# Patient Record
Sex: FEMALE | Race: WHITE | NOT HISPANIC OR LATINO | Employment: FULL TIME | URBAN - METROPOLITAN AREA
[De-identification: names, ages, dates, MRNs, and addresses within clinical notes are randomized per-mention and may not be internally consistent; named-entity substitution may affect disease eponyms.]

---

## 2018-01-18 ENCOUNTER — TRANSFERRED RECORDS (OUTPATIENT)
Dept: MULTI SPECIALTY CLINIC | Facility: CLINIC | Age: 38
End: 2018-01-18

## 2018-01-18 LAB
HPV ABSTRACT: ABNORMAL
PAP-ABSTRACT: NORMAL

## 2019-04-17 ENCOUNTER — OFFICE VISIT (OUTPATIENT)
Dept: FAMILY MEDICINE | Facility: CLINIC | Age: 39
End: 2019-04-17
Payer: COMMERCIAL

## 2019-04-17 VITALS
DIASTOLIC BLOOD PRESSURE: 76 MMHG | SYSTOLIC BLOOD PRESSURE: 118 MMHG | TEMPERATURE: 98.9 F | OXYGEN SATURATION: 100 % | RESPIRATION RATE: 16 BRPM | HEART RATE: 76 BPM | WEIGHT: 155 LBS | BODY MASS INDEX: 22.96 KG/M2 | HEIGHT: 69 IN

## 2019-04-17 DIAGNOSIS — F41.9 ANXIETY: ICD-10-CM

## 2019-04-17 DIAGNOSIS — Z87.898 HISTORY OF ALCOHOL USE: Primary | ICD-10-CM

## 2019-04-17 DIAGNOSIS — F32.1 MODERATE MAJOR DEPRESSION (H): ICD-10-CM

## 2019-04-17 PROCEDURE — 99203 OFFICE O/P NEW LOW 30 MIN: CPT | Performed by: PHYSICIAN ASSISTANT

## 2019-04-17 RX ORDER — VENLAFAXINE HYDROCHLORIDE 75 MG/1
75 CAPSULE, EXTENDED RELEASE ORAL DAILY
Refills: 0 | COMMUNITY
Start: 2019-03-21 | End: 2019-04-17

## 2019-04-17 RX ORDER — VENLAFAXINE HYDROCHLORIDE 75 MG/1
75 CAPSULE, EXTENDED RELEASE ORAL DAILY
Qty: 90 CAPSULE | Refills: 0 | Status: SHIPPED | OUTPATIENT
Start: 2019-04-17 | End: 2019-07-24

## 2019-04-17 ASSESSMENT — MIFFLIN-ST. JEOR: SCORE: 1448.49

## 2019-04-17 ASSESSMENT — PATIENT HEALTH QUESTIONNAIRE - PHQ9
5. POOR APPETITE OR OVEREATING: NOT AT ALL
SUM OF ALL RESPONSES TO PHQ QUESTIONS 1-9: 4

## 2019-04-17 ASSESSMENT — ANXIETY QUESTIONNAIRES
7. FEELING AFRAID AS IF SOMETHING AWFUL MIGHT HAPPEN: NOT AT ALL
GAD7 TOTAL SCORE: 3
2. NOT BEING ABLE TO STOP OR CONTROL WORRYING: NOT AT ALL
6. BECOMING EASILY ANNOYED OR IRRITABLE: SEVERAL DAYS
IF YOU CHECKED OFF ANY PROBLEMS ON THIS QUESTIONNAIRE, HOW DIFFICULT HAVE THESE PROBLEMS MADE IT FOR YOU TO DO YOUR WORK, TAKE CARE OF THINGS AT HOME, OR GET ALONG WITH OTHER PEOPLE: SOMEWHAT DIFFICULT
1. FEELING NERVOUS, ANXIOUS, OR ON EDGE: SEVERAL DAYS
5. BEING SO RESTLESS THAT IT IS HARD TO SIT STILL: NOT AT ALL
3. WORRYING TOO MUCH ABOUT DIFFERENT THINGS: SEVERAL DAYS

## 2019-04-17 ASSESSMENT — PAIN SCALES - GENERAL: PAINLEVEL: NO PAIN (0)

## 2019-04-17 NOTE — PATIENT INSTRUCTIONS
Follow up with addiction specialist for rule 25 evaluation.   Let us know more about needs for urine drug screens.   Continue effexor 75 mg daily and follow up with us within 3 months for pap and physical.   Return urgently if any change in symptoms or concerns

## 2019-04-17 NOTE — PROGRESS NOTES
c  SUBJECTIVE:   Radha Mo is a 38 year old female who presents to clinic today for the following   health issues:    Depression and Anxiety Follow-Up    Status since last visit: No change    Other associated symptoms:None    Complicating factors:     Significant life event: No     Current substance abuse: Not currently    PHQ 4/17/2019   PHQ-9 Total Score 4   Q9: Thoughts of better off dead/self-harm past 2 weeks Not at all     MICHAEL-7 SCORE 4/17/2019   Total Score 3       PHQ-9  English  PHQ-9   Any Language  MICHAEL-7  Suicide Assessment Five-step Evaluation and Treatment (SAFE-T)      Amount of exercise or physical activity: walking the dog    Problems taking medications regularly: No    Medication side effects: none    Diet: regular (no restrictions)    Drinking situation - wants rule 25 dependency assessment. Has never been through treatment  Issue between me and exboyfriend and drinking was involved.  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 Both depression and anxiety.   Sleep is all over the place- always has been- declines med    Has been Sober for a month    Was getting paps annually due to positive HPV - last pap 1/18/18 according to care everywhere - patient unsure         Additional history: as documented    Reviewed  and updated as needed this visit by clinical staff  Tobacco  Allergies  Meds  Med Hx  Surg Hx  Fam Hx  Soc Hx        Reviewed and updated as needed this visit by Provider  Tobacco  Allergies  Meds  Problems  Med Hx  Surg Hx  Fam Hx  Soc Hx          Patient Active Problem List   Diagnosis     UTI (urinary tract infection)     Past  "Surgical History:   Procedure Laterality Date     AS REMOVE TONSILS/ADENOIDS,<12 Y/O         Social History     Tobacco Use     Smoking status: Never Smoker     Smokeless tobacco: Never Used   Substance Use Topics     Alcohol use: Not Currently     Family History   Problem Relation Age of Onset     Cerebrovascular Disease Maternal Grandfather         stroke age late 70s     Breast Cancer No family hx of      Colon Cancer No family hx of      Diabetes No family hx of          Current Outpatient Medications   Medication Sig Dispense Refill     venlafaxine (EFFEXOR-XR) 75 MG 24 hr capsule Take 1 capsule (75 mg) by mouth daily 90 capsule 0     BP Readings from Last 3 Encounters:   04/17/19 118/76   05/07/12 107/72   09/08/11 109/67    Wt Readings from Last 3 Encounters:   04/17/19 70.3 kg (155 lb)   05/07/12 66.7 kg (147 lb)   09/08/11 65.8 kg (145 lb)                    ROS:  Constitutional, HEENT, cardiovascular, pulmonary, gi and gu systems are negative, except as otherwise noted.    OBJECTIVE:     /76 (BP Location: Right arm, Patient Position: Chair, Cuff Size: Adult Regular)   Pulse 76   Temp 98.9  F (37.2  C) (Oral)   Resp 16   Ht 1.746 m (5' 8.75\")   Wt 70.3 kg (155 lb)   LMP  (LMP Unknown)   SpO2 100%   Breastfeeding? No   BMI 23.06 kg/m    Body mass index is 23.06 kg/m .  GENERAL: healthy, alert and no distress  NECK: no adenopathy, no asymmetry, masses, or scars and thyroid normal to palpation  RESP: lungs clear to auscultation - no rales, rhonchi or wheezes  CV: regular rate and rhythm, normal S1 S2, no S3 or S4, no murmur, click or rub, no peripheral edema and peripheral pulses strong  ABDOMEN: soft, nontender, no hepatosplenomegaly, no masses and bowel sounds normal  MS: no gross musculoskeletal defects noted, no edema  PSYCH: mentation appears normal, affect normal/bright, judgement and insight intact and appearance well groomed    Diagnostic Test Results:  none     ASSESSMENT/PLAN: "       Due for pap and physical - she will schedule within the next 3 months       1. History of alcohol use  Would like rule 25 assessment as recommended by probation.  Care coordinator contacted patient through my request  Encouraged to get more information from probation as far as type of drug screen needed and we will do our best to help facilitate urine drug screens   - ADDICTION MEDICINE REFERRAL  - CARE COORDINATION REFERRAL    2. Anxiety  Symptoms controlled with current med.    - venlafaxine (EFFEXOR-XR) 75 MG 24 hr capsule; Take 1 capsule (75 mg) by mouth daily  Dispense: 90 capsule; Refill: 0    3. Moderate major depression (H)  Symptoms controlled with current med   - venlafaxine (EFFEXOR-XR) 75 MG 24 hr capsule; Take 1 capsule (75 mg) by mouth daily  Dispense: 90 capsule; Refill: 0    Patient Instructions   Follow up with addiction specialist for rule 25 evaluation.   Let us know more about needs for urine drug screens.   Continue effexor 75 mg daily and follow up with us within 3 months for pap and physical.   Return urgently if any change in symptoms or concerns       Amanda Avila PA-C  [facility]

## 2019-04-18 ENCOUNTER — PATIENT OUTREACH (OUTPATIENT)
Dept: CARE COORDINATION | Facility: CLINIC | Age: 39
End: 2019-04-18

## 2019-04-18 ENCOUNTER — TELEPHONE (OUTPATIENT)
Dept: ADDICTION MEDICINE | Facility: CLINIC | Age: 39
End: 2019-04-18

## 2019-04-18 ASSESSMENT — ACTIVITIES OF DAILY LIVING (ADL): DEPENDENT_IADLS:: INDEPENDENT

## 2019-04-18 ASSESSMENT — ANXIETY QUESTIONNAIRES: GAD7 TOTAL SCORE: 3

## 2019-04-18 NOTE — LETTER
Health Care Home - Access Care Plan    About Me:    Patient Name:  Radha Mo    YOB: 1980  Age:                      39 year old   John MRN:     [id number] Telephone Information:   Home Phone [phone]   Mobile [phone]       Address:  [address]  [address]  [place] [facility] [address] Email address:  No e-mail address on record      Emergency Contact(s)   Name Relationship Lgl Grd Work Phone Home Phone Mobile Phone   1. CHARLOTTE VOSS Mother   [phone]            Health Maintenance: Routine Health maintenance Reviewed: Due/Overdue:  Health Maintenance Due   Topic Date Due     PREVENTIVE CARE VISIT  1980     DEPRESSION ACTION PLAN  1980     PAP  1980     HIV SCREENING  04/18/1996     My Access Plan  Medical Emergency 911   Questions or concerns during clinic hours Primary Clinic Line, I will call the clinic directly: [phone]   24 Hour Appointment Line [phone] or  [phone]) (toll free)   24 Hour Nurse Line [phone] (toll free)   Questions or concerns outside clinic hours 24 Hour Appointment Line, I will call the after-hours on-call line:   [facility] [phone] or [phone]) (toll-free)   Preferred Urgent Care Other   Preferred Hospital Other   Preferred Pharmacy [facility] [address] AT [facility] & [facility] [address]     Behavioral Health Crisis Line The National Suicide Prevention Lifeline at 1-673.499.1722 or 919         My Care Team Members  Patient Care Team       Relationship Specialty Notifications Start End    [facility] PCP - General   4/17/19     Phone: [phone] Fax: [phone] [address] [facility] [address]    Mena Noriega LSW Clinic Care Coordinator Primary Care - CC Admissions 4/18/19     Phone: [phone] Fax: [phone]               My Medical and Care Information  Problem List   Patient Active  Problem List   Diagnosis     UTI (urinary tract infection)      Current Medications and Allergies:  See printed Medication Report

## 2019-04-18 NOTE — TELEPHONE ENCOUNTER
Please schedule appointment for patient with Addiction Medicine provider for alcohol use.    Please let her know if she is needing a CD assessment the number to call is [facility]: outpatient chemical dependency intake  [phone]

## 2019-04-18 NOTE — PROGRESS NOTES
Clinic Care Coordination Contact    Clinic Care Coordination Contact  OUTREACH    Referral Information:  Referral Source: PCP  Primary Diagnosis: Dual -  MH/CD    SW received referral from clinic provider to assist pt with a Rule 25/Chemical Health Assessment.  [facility] had already called pt, so this writer explained self, title and role.  Pt agreed to speak with this writer.    SW asked the pt about her health insurance and pt shared she has [employer].  SW educated the pt that based on her insurance, she will need to contact her insurance company to request behavioral health/chemical health services and work within that network, otherwise she may end up with a large co-pay.  SW provided the pt with the phone number of [phone] and verified her member ID number.    SW and pt then discussed pt's knowledge about a chemical health assessment & pt shared she has never had to go through this before.  SW asked if pt would like general information about the next steps.  Pt stated that would ease her anxiety.  Pt reported sobriety of 1 month, did not go through withdrawals, and is not actively craving alcohol.    SW explained that once she determines which agency can do her Chemical Health Assessment, she will meet face to face with a LADC (licensed alcohol and drug counselor) and that person will be completing a 15+ page document which entails use, hx of use, triggers, sobriety events, mental health, family background, etc.  The purpose of the assessment is to determine what future needs the pt has in relation to chemical health treatment programs, such as AA meetings, outpatient meetings, intensive outpatient, or inpatient cares.  RAVINDRA discussed that being open & honest during this assessment will guide the  for best clinical cares.    RAVINDRA offered to assist pt with making calls/appointments, but pt shared she will call her health insurance once she is off of work today.   RAVINDRA stressed the importance of finding a  in her network, as out of pocket assessments cost approximately $250-$400.    RAVINDRA offered name & contact number should pt have further questions about the process or her assessment.    Chief Complaint   Patient presents with     Clinic Care Coordination - Initial     social work      Universal Utilization: Clinic Utilization  Difficulty keeping appointments:: No  Compliance Concerns: No  No-Show Concerns: No  No PCP office visit in Past Year: No  Utilization    Last refreshed: 4/18/2019  2:53 PM:  Hospital Admissions 0           Last refreshed: 4/18/2019  2:53 PM:  ED Visits 0           Last refreshed: 4/18/2019  2:53 PM:  No Show Count (past year) 0              Current as of: 4/18/2019  2:53 PM            Clinical Concerns:  Current Medical Concerns:  Chemical health situation  Current Behavioral Concerns: Pt with hx of anxiety, depression and ETOH use.    Education Provided to patient: See above    Pain  Pain (GOAL):: No    Health Maintenance Reviewed: Due/Overdue:  Health Maintenance Due   Topic Date Due     PREVENTIVE CARE VISIT  1981     DEPRESSION ACTION PLAN  04/18/1999     HIV SCREEN (SYSTEM ASSIGNED)  04/18/1999     PHQ-9 Q6 MONTHS  04/18/1999     PAP Q1 YR  02/09/2013     INFLUENZA VACCINE (1) 09/01/2018     Clinical Pathway: None    Medication Management:  Pt states she just got her prescription filled yesterday.     Functional Status:  Dependent ADLs:: Independent  Dependent IADLs:: Independent  Bed or wheelchair confined:: No  Mobility Status: Independent  Fallen 2 or more times in the past year?: No  Any fall with injury in the past year?: No    Living Situation:  Current living arrangement:: I live alone  Type of residence:: Apartment    Diet/Exercise/Sleep:  Diet:: Regular  Inadequate nutrition (GOAL):: No  Food Insecurity: No  Tube Feeding: No    Transportation:  Transportation concerns (GOAL):: No  Transportation means:: Regular car      Psychosocial:  Sikhism or spiritual beliefs that impact treatment:: No  Mental health DX:: Yes  Mental health DX how managed:: Medication  Mental health management concern (GOAL):: No  Informal Support system:: Family, Friends     Financial/Insurance: Works for [employer]  Financial/Insurance concerns (GOAL):: No     Resources and Interventions:  Current Resources: Friends, family  Community Resources: None  Supplies used at home:: None  Equipment Currently Used at Home: none    Advance Care Plan/Directive  Advanced Care Plans/Directives on file:: No    Referrals Placed: CD     Goals:  NA      Patient/Caregiver understanding: Pt will call her insurance to verify where she can obtain a chemical health assessment.  Pt will contact this writer if she has further questions    Outreach Frequency: monthly      Plan: SW to contact pt in 1 month to discuss follow up of Chemical health assessment and behavioral health needs.    Mena Millan  Social Work Care Coordinator  [facility]  [phone]

## 2019-04-18 NOTE — LETTER
[place] CARE COORDINATION  [address] [address]      May 22, 2019    Radha Mo  [address]  [place] [address]      Dear Radha,    I am a clinic care coordinator who works with the [facility] medical providers.  I recently tried to call and was unable to reach you. I wanted to follow up on our previous conversation about getting a Rule 25 & to provide you with my contact information so that you can call me with questions or concerns about your health care. Below is a description of clinic care coordination and how I can further assist you.     The clinic care coordinator is a registered nurse and/or  who understand the health care system. The goal of clinic care coordination is to help you manage your health and improve access to the [facility] system in the most efficient manner. The registered nurse can assist you in meeting your health care goals by providing education, coordinating services, and strengthening the communication among your providers. The  can assist you with financial, behavioral, psychosocial, chemical dependency, counseling, and/or psychiatric resources.    Please feel free to contact me at [phone], with any questions or concerns. We at [facility] are focused on providing you with the highest-quality healthcare experience possible and that all starts with you.     Sincerely,     Mena Noriega    Enclosed: I have enclosed a copy of a 24 Hour Access Plan. This has helpful phone numbers for you to call when needed. Please keep this in an easy to access place to use as needed.

## 2019-04-18 NOTE — TELEPHONE ENCOUNTER
Writer reached out to pt to schedule. Pt stated that she is wanting a CD Assessment at this time rather than Addiction Medicine outpatient care. Writer informed pt that her referral is good for 1 year, contact information given for both [facility]. Writer routing to referring provider & Dr. Manriquez. Closing encounter. No further f/u needed.     Kaitlin Butt  [facility]

## 2019-05-22 ASSESSMENT — ACTIVITIES OF DAILY LIVING (ADL): DEPENDENT_IADLS:: INDEPENDENT

## 2019-05-22 NOTE — PROGRESS NOTES
Clinic Care Coordination Contact  [facility]/Voicemail    Referral Source: Care Team    Clinical Data: Care Coordinator Outreach    Outreach attempted x 1.  Left message on voicemail with call back information and requested return call.    Plan: Care Coordinator mailed out care coordination introduction letter on 5-22-19. Care Coordinator will try to reach patient again in 5-10 business days.    Mena Millan  Social Work Care Coordinator  [facility]  [phone]

## 2019-06-19 ENCOUNTER — PATIENT OUTREACH (OUTPATIENT)
Dept: CARE COORDINATION | Facility: CLINIC | Age: 39
End: 2019-06-19

## 2019-06-19 ASSESSMENT — ACTIVITIES OF DAILY LIVING (ADL): DEPENDENT_IADLS:: INDEPENDENT

## 2019-06-19 NOTE — PROGRESS NOTES
Clinic Care Coordination Contact  Care Team Conversations    SWCC reviewd pt's EMR and then placed call to patient for follow up care coordination cares.  SW introduced self, title.    Pt explained that she is at work right now and would like to return my call at 430 PM today.  SW welcomed a return call and stated I would stay in the office today to receive her call.     Mena Millan  Social Work Care Coordinator  [facility]  [phone]

## 2019-07-12 ENCOUNTER — PATIENT OUTREACH (OUTPATIENT)
Dept: CARE COORDINATION | Facility: CLINIC | Age: 39
End: 2019-07-12

## 2019-07-12 NOTE — PROGRESS NOTES
Clinic Care Coordination Contact  [facility]/Voicemail       Clinical Data: Care Coordinator Outreach    Outreach attempted x 2.  Left message on voicemail with call back information and requested return call.    Plan: Care Coordinator mailed out care coordination introduction letter on 5-22-19. Care Coordinator will do no further outreaches at this time.    Mena Millan  Social Work Care Coordinator  [facility]  [phone]

## 2019-07-24 DIAGNOSIS — F41.9 ANXIETY: ICD-10-CM

## 2019-07-24 DIAGNOSIS — F32.1 MODERATE MAJOR DEPRESSION (H): ICD-10-CM

## 2019-07-24 RX ORDER — VENLAFAXINE HYDROCHLORIDE 75 MG/1
75 CAPSULE, EXTENDED RELEASE ORAL DAILY
Qty: 30 CAPSULE | Refills: 0 | Status: SHIPPED | OUTPATIENT
Start: 2019-07-24 | End: 2019-09-05

## 2019-07-24 NOTE — TELEPHONE ENCOUNTER
"Requested Prescriptions   Pending Prescriptions Disp Refills     venlafaxine (EFFEXOR-XR) 75 MG 24 hr capsule 90 capsule 0     Sig: Take 1 capsule (75 mg) by mouth daily       Serotonin-Norepinephrine Reuptake Inhibitors  Failed - 7/24/2019 12:35 PM        Failed - Normal serum creatinine on file in past 12 months     No lab results found.          Passed - Blood pressure under 140/90 in past 12 months     BP Readings from Last 3 Encounters:   04/17/19 118/76   05/07/12 107/72   09/08/11 109/67                 Passed - PHQ-9 score of less than 5 in past 6 months     Please review last PHQ-9 score.           Passed - Medication is active on med list        Passed - Patient is age 18 or older        Passed - No active pregnancy on record        Passed - No positive pregnancy test in past 12 months        Passed - Recent (6 mo) or future (30 days) visit within the authorizing provider's specialty     Patient had office visit in the last 6 months or has a visit in the next 30 days with authorizing provider or within the authorizing provider's specialty.  See \"Patient Info\" tab in inbasket, or \"Choose Columns\" in Meds & Orders section of the refill encounter.            venlafaxine (EFFEXOR-XR) 75 MG 24 hr capsule  Last Written Prescription Date:  4/17/19  Last Fill Quantity: 90,  # refills: 0   Last office visit: 4/17/2019 with prescribing provider:  Amanda Avila   Future Office Visit:   Next 5 appointments (look out 90 days)    Jul 31, 2019  6:00 PM CDT  PHYSICAL with Amanda Avila PA-C  [facility] [address] [address] [phone]           "

## 2019-07-24 NOTE — TELEPHONE ENCOUNTER
Routing refill request to provider for review/approval because:  Labs not current:  Serum creatinine    LINDA CourtneyN, RN, PHN

## 2019-07-24 NOTE — TELEPHONE ENCOUNTER
.Reason for Call:  Medication or medication refill:    Do you use a [facility]?  Name of the pharmacy and phone number for the current request:  [facility] [phone]    Name of the medication requested: venlafaxine(effexor xr) 75 milligram 24 hr cap    Other request: scheduled appointment for 07-31//will run out before then; Thank you    Can we leave a detailed message on this number? YES    Phone number patient can be reached at: Home number on file [phone] (home)    Best Time: anytime    Call taken on 7/24/2019 at 12:32 PM by Mary Cuevas

## 2019-09-03 DIAGNOSIS — F41.9 ANXIETY: ICD-10-CM

## 2019-09-03 DIAGNOSIS — F32.1 MODERATE MAJOR DEPRESSION (H): ICD-10-CM

## 2019-09-03 NOTE — TELEPHONE ENCOUNTER
Reason for Call:  Medication or medication refill:    Do you use a [facility]?  Name of the pharmacy and phone number for the current request:  [id number] [address] [facility] [address]     Name of the medication requested: venlafaxine (EFFEXOR-XR) 75 MG 24 hr capsule    Can we leave a detailed message on this number? YES    Phone number patient can be reached at: Home number on file [phone] (home)    Best Time: anytime    Call taken on 9/3/2019 at 2:37 PM by Alli Godinez

## 2019-09-04 NOTE — TELEPHONE ENCOUNTER
"Requested Prescriptions   Pending Prescriptions Disp Refills     venlafaxine (EFFEXOR-XR) 75 MG 24 hr capsule 30 capsule 0     Sig: Take 1 capsule (75 mg) by mouth daily       Serotonin-Norepinephrine Reuptake Inhibitors  Failed - 9/3/2019  2:51 PM        Failed - Normal serum creatinine on file in past 12 months     No lab results found.          Passed - Blood pressure under 140/90 in past 12 months     BP Readings from Last 3 Encounters:   04/17/19 118/76   05/07/12 107/72   09/08/11 109/67                 Passed - PHQ-9 score of less than 5 in past 6 months     Please review last PHQ-9 score.           Passed - Medication is active on med list        Passed - Patient is age 18 or older        Passed - No active pregnancy on record        Passed - No positive pregnancy test in past 12 months        Passed - Recent (6 mo) or future (30 days) visit within the authorizing provider's specialty     Patient had office visit in the last 6 months or has a visit in the next 30 days with authorizing provider or within the authorizing provider's specialty.  See \"Patient Info\" tab in inbasket, or \"Choose Columns\" in Meds & Orders section of the refill encounter.            venlafaxine (EFFEXOR-XR) 75 MG 24 hr capsule  Last Written Prescription Date:  7/24/19  Last Fill Quantity: 30,  # refills: 0   Last office visit: 4/17/2019 with prescribing provider:  Amanda Avila   Future Office Visit:   Next 5 appointments (look out 90 days)    Sep 11, 2019  6:00 PM CDT  PHYSICAL with Amanda Avila PA-C  [facility] [address] [facility] [address] [phone]           "

## 2019-09-05 RX ORDER — VENLAFAXINE HYDROCHLORIDE 75 MG/1
75 CAPSULE, EXTENDED RELEASE ORAL DAILY
Qty: 30 CAPSULE | Refills: 0 | Status: SHIPPED | OUTPATIENT
Start: 2019-09-05 | End: 2019-10-01

## 2019-09-05 NOTE — TELEPHONE ENCOUNTER
Patient is out now can this be filled today  Patient has appointment with you on the 11th      Thank you

## 2019-10-01 ENCOUNTER — OFFICE VISIT (OUTPATIENT)
Dept: FAMILY MEDICINE | Facility: CLINIC | Age: 39
End: 2019-10-01
Payer: COMMERCIAL

## 2019-10-01 ENCOUNTER — DOCUMENTATION ONLY (OUTPATIENT)
Dept: FAMILY MEDICINE | Facility: CLINIC | Age: 39
End: 2019-10-01

## 2019-10-01 VITALS
TEMPERATURE: 99 F | DIASTOLIC BLOOD PRESSURE: 100 MMHG | HEIGHT: 68 IN | WEIGHT: 156 LBS | SYSTOLIC BLOOD PRESSURE: 147 MMHG | OXYGEN SATURATION: 98 % | BODY MASS INDEX: 23.64 KG/M2 | RESPIRATION RATE: 18 BRPM | HEART RATE: 72 BPM

## 2019-10-01 DIAGNOSIS — Z00.00 ROUTINE GENERAL MEDICAL EXAMINATION AT A HEALTH CARE FACILITY: Primary | ICD-10-CM

## 2019-10-01 DIAGNOSIS — F32.1 MODERATE MAJOR DEPRESSION (H): ICD-10-CM

## 2019-10-01 DIAGNOSIS — Z11.3 SCREEN FOR STD (SEXUALLY TRANSMITTED DISEASE): ICD-10-CM

## 2019-10-01 DIAGNOSIS — F41.9 ANXIETY: ICD-10-CM

## 2019-10-01 DIAGNOSIS — Z12.4 SCREENING FOR MALIGNANT NEOPLASM OF CERVIX: ICD-10-CM

## 2019-10-01 DIAGNOSIS — G43.109 MIGRAINE WITH AURA AND WITHOUT STATUS MIGRAINOSUS, NOT INTRACTABLE: ICD-10-CM

## 2019-10-01 LAB
SPECIMEN SOURCE: ABNORMAL
WET PREP SPEC: ABNORMAL

## 2019-10-01 PROCEDURE — 87591 N.GONORRHOEAE DNA AMP PROB: CPT | Performed by: NURSE PRACTITIONER

## 2019-10-01 PROCEDURE — 99395 PREV VISIT EST AGE 18-39: CPT | Performed by: NURSE PRACTITIONER

## 2019-10-01 PROCEDURE — 87491 CHLMYD TRACH DNA AMP PROBE: CPT | Performed by: NURSE PRACTITIONER

## 2019-10-01 PROCEDURE — 87210 SMEAR WET MOUNT SALINE/INK: CPT | Performed by: NURSE PRACTITIONER

## 2019-10-01 RX ORDER — VENLAFAXINE HYDROCHLORIDE 75 MG/1
75 CAPSULE, EXTENDED RELEASE ORAL DAILY
Qty: 90 CAPSULE | Refills: 3 | Status: SHIPPED | OUTPATIENT
Start: 2019-10-01 | End: 2020-09-03

## 2019-10-01 ASSESSMENT — PAIN SCALES - GENERAL: PAINLEVEL: NO PAIN (0)

## 2019-10-01 ASSESSMENT — MIFFLIN-ST. JEOR: SCORE: 1436.11

## 2019-10-01 NOTE — PROGRESS NOTES
SUBJECTIVE:   CC: Radha Mo is an 39 year old woman who presents for preventive health visit.     Healthy Habits:    Do you get at least three servings of calcium containing foods daily (dairy, green leafy vegetables, etc.)? yes    Amount of exercise or daily activities, outside of work: 7 day(s) per week-walking the dog    Problems taking medications regularly No    Medication side effects: No    Have you had an eye exam in the past two years? No is due for eye check, glasses sometimes    Do you see a dentist twice per year? No, has appointment in a week    Do you have sleep apnea, excessive snoring or daytime drowsiness? daytime drowsiness, wakes up not rested, tossing and turning.     Accomodation for Migraines form -  Calling in about once a month due to migraines caused from stress. Uses ibuprofen, rest, dark room to help. Is on a computer at work, uses head set to take provider phone calls, gets some wrist discomfort also at times from constant typing.     Menses: regular. She would like to be STD tested today    Due for pap smear, has hx of abnormal paps, per note from April was getting them yearly, last one Jan 2018.    Is no longer with ex, not drinking much now. Stated the issue with the  being called in April was due to a fight, he called the  and she was arrested, then she had to do routine urine drug screenings. She denies she needs that done now. No smoking, no vaping or other drugs.      Today's PHQ-2 Score:   PHQ-2 ( 1999 [employer]) 4/17/2019 5/7/2012   Q1: Little interest or pleasure in doing things 1 0   Q2: Feeling down, depressed or hopeless 1 0   PHQ-2 Score 2 0       Abuse: Current or Past(Physical, Sexual or Emotional)- No  Do you feel safe in your environment? Yes    Social History     Tobacco Use     Smoking status: Never Smoker     Smokeless tobacco: Never Used   Substance Use Topics     Alcohol use: Not Currently     If you drink alcohol do you typically have >3 drinks per day  "or >7 drinks per week? No                     Reviewed orders with patient.  Reviewed health maintenance and updated orders accordingly - Yes  Lab work is in process  Labs reviewed in EPIC    Mammogram not appropriate for this patient based on age.    Pertinent mammograms are reviewed under the imaging tab.  History of abnormal Pap smear: YES - age 30- 65 PAP yearly recommended     Reviewed and updated as needed this visit by clinical staff  Tobacco  Allergies  Meds  Med Hx  Surg Hx  Fam Hx  Soc Hx        Reviewed and updated as needed this visit by Provider            ROS:  CONSTITUTIONAL: NEGATIVE for fever, chills, change in weight  INTEGUMENTARU/SKIN: NEGATIVE for worrisome rashes, moles or lesions  EYES: NEGATIVE for vision changes or irritation  ENT: NEGATIVE for ear, mouth and throat problems  RESP: NEGATIVE for significant cough or SOB  BREAST: NEGATIVE for masses, tenderness or discharge  CV: NEGATIVE for chest pain, palpitations or peripheral edema  GI: NEGATIVE for nausea, abdominal pain, heartburn, or change in bowel habits  : NEGATIVE for unusual urinary or vaginal symptoms. Periods are regular.  MUSCULOSKELETAL: NEGATIVE for significant arthralgias or myalgia  NEURO: NEGATIVE for weakness, dizziness or paresthesias  PSYCHIATRIC: NEGATIVE for changes in mood or affect    OBJECTIVE:   BP (!) 147/87 (BP Location: Right arm, Patient Position: Sitting, Cuff Size: Adult Regular)   Pulse 72   Temp 99  F (37.2  C) (Oral)   Resp 18   Ht 1.727 m (5' 8\")   Wt 70.8 kg (156 lb)   LMP 09/19/2019   SpO2 98%   BMI 23.72 kg/m    EXAM:  GENERAL: healthy, alert and no distress  EYES: Eyes grossly normal to inspection, PERRL and conjunctivae and sclerae normal  HENT: ear canals and TM's normal, nose and mouth without ulcers or lesions  NECK: no adenopathy, no asymmetry, masses, or scars and thyroid normal to palpation  RESP: lungs clear to auscultation - no rales, rhonchi or wheezes  BREAST: normal without " masses, tenderness or nipple discharge and no palpable axillary masses or adenopathy  CV: regular rate and rhythm, normal S1 S2, no S3 or S4, no murmur, click or rub  ABDOMEN: soft, nontender, no hepatosplenomegaly, no masses and bowel sounds normal   (female): normal female external genitalia, normal urethral meatus, vaginal mucosa pink, moist, well rugated, vaginal walls falling into speculum view, TERRI to view cervix at all, slight white discharge noted and swabbed. Manual exam unable to palpate cervix ?due to longer vaginal canal? No pain with manual exam  MS: no gross musculoskeletal defects noted, no edema  SKIN: no suspicious lesions or rashes  NEURO: Normal strength and tone, mentation intact and speech normal  PSYCH: mentation appears normal, affect normal/bright    Diagnostic Test Results:  Labs reviewed in Epic  Results for orders placed or performed in visit on 10/01/19 (from the past 24 hour(s))   Wet prep   Result Value Ref Range    Specimen Description Vagina     Wet Prep No Trichomonas seen     Wet Prep No clue cells seen     Wet Prep Rare  Yeast seen   (A)     Wet Prep Few  WBC'S seen          ASSESSMENT/PLAN:   1. Routine general medical examination at a health care facility  Needs to return for fasting labs  - Lipid panel reflex to direct LDL Fasting; Future  - Glucose; Future    2. Anxiety  See below  - venlafaxine (EFFEXOR-XR) 75 MG 24 hr capsule; Take 1 capsule (75 mg) by mouth daily  Dispense: 90 capsule; Refill: 3    3. Moderate major depression (H)  Refilled, stable  - venlafaxine (EFFEXOR-XR) 75 MG 24 hr capsule; Take 1 capsule (75 mg) by mouth daily  Dispense: 90 capsule; Refill: 3    4. Screening for malignant neoplasm of cervix  Unable to complete. Very difficult to get cervix into view at all, tried 3 different sized speculums. Advised patient to return to have pap done by one of our MDs at [facility]. She verbalized understanding. She said she has been told her cervix is tilted and  "very hard to find.     5. Migraine with aura and without status migrainosus, not intractable  Stable. Filled out FMLA type forms see encounter    6. Screen for STD (sexually transmitted disease)  Screening performed  - Chlamydia trachomatis PCR  - Neisseria gonorrhoeae PCR  - Wet prep    COUNSELING:   Reviewed preventive health counseling, as reflected in patient instructions    Estimated body mass index is 23.72 kg/m  as calculated from the following:    Height as of this encounter: 1.727 m (5' 8\").    Weight as of this encounter: 70.8 kg (156 lb).         reports that she has never smoked. She has never used smokeless tobacco.      Counseling Resources:  ATP IV Guidelines  Pooled Cohorts Equation Calculator  Breast Cancer Risk Calculator  FRAX Risk Assessment  ICSI Preventive Guidelines  Dietary Guidelines for Americans, 2010  USDA's MyPlate  ASA Prophylaxis  Lung CA Screening    Follow up with a provider for pap in the next few months, otherwise in 1 year RICARDO Serna, NP-C  [facility]    "

## 2019-10-01 NOTE — LETTER
[address]  [address]  [phone]    October 3, 2019      Radha Mo  [address]  [place] [address]                  Ms. Mo,     All of your labs were normal for you other than the slightly positive yeast.     Please contact the clinic if you have additional questions.  Thank you.     Sincerely,     RICARDO Gómez, NP-C   [facility]

## 2019-10-02 ENCOUNTER — TELEPHONE (OUTPATIENT)
Dept: FAMILY MEDICINE | Facility: CLINIC | Age: 39
End: 2019-10-02

## 2019-10-02 NOTE — PROGRESS NOTES
When patient was call to let her know the form was ready for , she requested we fax it to the number at the top of the screen.  So form was faxed to [phone].    Luana Edge

## 2019-10-02 NOTE — TELEPHONE ENCOUNTER
This writer attempted to contact Radha on 10/02/19      Reason for call lab results and left message.      If patient calls back:   [facility] Care Team (MA/TC) called. Inform patient that someone from the team will contact them, document that pt called and route to care team.     Message about results below.      Please call patient: Wet prep is negative for trichomonas and negative for bacterial vaginosis.  A very small amount of yeast was noted.  If she is not having any symptoms we do not need to treat at this time.  If she feels she is slightly symptomatic such as itching, she can use over-the-counter vaginal Monistat 3 for treatment. Other blood work is still pending. - Tata Joiner MA

## 2019-10-03 LAB
C TRACH DNA SPEC QL NAA+PROBE: NEGATIVE
N GONORRHOEA DNA SPEC QL NAA+PROBE: NEGATIVE
SPECIMEN SOURCE: NORMAL
SPECIMEN SOURCE: NORMAL

## 2019-12-12 ENCOUNTER — TELEPHONE (OUTPATIENT)
Dept: FAMILY MEDICINE | Facility: CLINIC | Age: 39
End: 2019-12-12

## 2019-12-12 NOTE — TELEPHONE ENCOUNTER
Reason for Call:  Other     Detailed comments: patient right now has accomodation for work for having migraines that she could leave work 1 to 2 days a month and asking if she could get up to 4 days off per month for migraines?    Phone Number Patient can be reached at: Home number on file [phone] (home)    Best Time: any    Can we leave a detailed message on this number? YES    Call taken on 12/12/2019 at 3:16 PM by Falguni French

## 2019-12-13 NOTE — TELEPHONE ENCOUNTER
This writer attempted to contact pt on 12/13/19      Reason for call relay message and left message.      If patient calls back:   Relay message Provider likely needs new form to fill out new amount of days to be off work. Willing to fill out BUT if she needs more dates off after this she needs to be seen for further evaluation., (read verbatim), document that pt called and close encounter        Cari Crowley

## 2019-12-13 NOTE — TELEPHONE ENCOUNTER
Provider likely needs new form to fill out new amount of days to be off work. Willing to fill out BUT if she needs more dates off after this she needs to be seen for further evaluation.  RICARDO Gómez, NP-C  [facility]

## 2019-12-16 ENCOUNTER — OFFICE VISIT (OUTPATIENT)
Dept: FAMILY MEDICINE | Facility: CLINIC | Age: 39
End: 2019-12-16
Payer: COMMERCIAL

## 2019-12-16 VITALS
HEART RATE: 70 BPM | WEIGHT: 162 LBS | TEMPERATURE: 97.7 F | OXYGEN SATURATION: 96 % | HEIGHT: 68 IN | BODY MASS INDEX: 24.55 KG/M2 | RESPIRATION RATE: 17 BRPM | DIASTOLIC BLOOD PRESSURE: 86 MMHG | SYSTOLIC BLOOD PRESSURE: 120 MMHG

## 2019-12-16 DIAGNOSIS — R51.9 DAILY HEADACHE: ICD-10-CM

## 2019-12-16 DIAGNOSIS — G43.001 MIGRAINE WITHOUT AURA AND WITH STATUS MIGRAINOSUS, NOT INTRACTABLE: Primary | ICD-10-CM

## 2019-12-16 DIAGNOSIS — G47.00 INSOMNIA, UNSPECIFIED TYPE: ICD-10-CM

## 2019-12-16 DIAGNOSIS — F32.1 MODERATE MAJOR DEPRESSION (H): ICD-10-CM

## 2019-12-16 DIAGNOSIS — F41.9 ANXIETY: ICD-10-CM

## 2019-12-16 PROCEDURE — 99214 OFFICE O/P EST MOD 30 MIN: CPT | Performed by: PHYSICIAN ASSISTANT

## 2019-12-16 RX ORDER — SUMATRIPTAN 50 MG/1
50 TABLET, FILM COATED ORAL
Qty: 18 TABLET | Refills: 0 | Status: SHIPPED | OUTPATIENT
Start: 2019-12-16 | End: 2021-05-07

## 2019-12-16 ASSESSMENT — ANXIETY QUESTIONNAIRES
6. BECOMING EASILY ANNOYED OR IRRITABLE: SEVERAL DAYS
3. WORRYING TOO MUCH ABOUT DIFFERENT THINGS: SEVERAL DAYS
IF YOU CHECKED OFF ANY PROBLEMS ON THIS QUESTIONNAIRE, HOW DIFFICULT HAVE THESE PROBLEMS MADE IT FOR YOU TO DO YOUR WORK, TAKE CARE OF THINGS AT HOME, OR GET ALONG WITH OTHER PEOPLE: SOMEWHAT DIFFICULT
7. FEELING AFRAID AS IF SOMETHING AWFUL MIGHT HAPPEN: NOT AT ALL
5. BEING SO RESTLESS THAT IT IS HARD TO SIT STILL: NOT AT ALL
GAD7 TOTAL SCORE: 4
1. FEELING NERVOUS, ANXIOUS, OR ON EDGE: SEVERAL DAYS
2. NOT BEING ABLE TO STOP OR CONTROL WORRYING: NOT AT ALL

## 2019-12-16 ASSESSMENT — MIFFLIN-ST. JEOR: SCORE: 1463.33

## 2019-12-16 ASSESSMENT — PAIN SCALES - GENERAL: PAINLEVEL: NO PAIN (0)

## 2019-12-16 ASSESSMENT — PATIENT HEALTH QUESTIONNAIRE - PHQ9
SUM OF ALL RESPONSES TO PHQ QUESTIONS 1-9: 9
5. POOR APPETITE OR OVEREATING: SEVERAL DAYS

## 2019-12-16 NOTE — Clinical Note
Please abstract the following data from this visit with this patient into the appropriate field in Epic:Tests that can be patient reported without a hard copy:Pap smear done on this date: 1/18/18 (approximately), by this group: [facility] , results were NIL. Other Tests found in the patient's chart through Chart Review/Care Everywhere:Pap smear done by this group [facility] on this date: 1/18/18Note to Abstraction: If this section is blank, no results were found via Chart Review/Care Everywhere.

## 2019-12-16 NOTE — TELEPHONE ENCOUNTER
Spoke with pt, pt had forms filled out this morning by Hema Avila.    Zeynep HAMMOND, Patient Care

## 2019-12-16 NOTE — PATIENT INSTRUCTIONS
Take imitrex at onset of headache  Take amitriptyline at bedtime to help prevent headache  Continue effexor at current dose  Follow up with us in one month  Call with side effects or concerns.

## 2019-12-16 NOTE — PROGRESS NOTES
Subjective     Radha Mo is a 39 year old female who presents to clinic today for the following health issues:    HPI   Migraine     Since your last clinic visit, how have your headaches changed?  Worsened    How often are you getting headaches or migraines? Daily headaches     Are you able to do normal daily activities when you have a migraine? No    Are you taking rescue/relief medications? (Select all that apply) ibuprofen (Advil, Motrin)    How helpful is your rescue/relief medication?  I get total relief    Are you taking any medications to prevent migraines? (Select all that apply)  No    In the past 4 weeks, how often have you gone to urgent care or the emergency room because of your headaches?  0      How many servings of fruits and vegetables do you eat daily?  4 or more    On average, how many sweetened beverages do you drink each day (Examples: soda, juice, sweet tea, etc.  Do NOT count diet or artificially sweetened beverages)?   0    How many days per week do you miss taking your medication? 0        PHQ-9 SCORE 4/17/2019 12/16/2019   PHQ-9 Total Score 4 9     [facility] Follow-up to PHQ 4/17/2019 12/16/2019   PHQ-9 9. Suicide Ideation past 2 weeks Not at all Not at all     MICHAEL-7 SCORE 4/17/2019 12/16/2019   Total Score 3 4         Migraines and muscle spasms  Headache pretty consistent- every day - every other day.  Migraines couple times a month. To point of nausea and vomiting.  advil doesn't take away but sleep helps  No triggers except stress  Sleep not consistent- usually up 2-3AM  and struggles to fall asleep  Able to get back to sleep 1/2 hour before alarm goes off   Has never been on migraine medication  Headache to point that makes me nauseated and some blocks out part of seeing like staring at son and need to lay down and not move  Light headache daily but migraines 1-2 times a month  Last month missed 4 days work  Working from home now and a little easier to work without overhead lights  No  "family history  Of migrines.   No aura  Takes melatonin from sleep  Jaw clenching from effexor.  effexor has certainly helped with anxiety  effexor Helps with anxiety  Palpitations since started effexor - more anxiety - no chest pain or shortness of breath.  Is drinking alcohol.  Doesn't feel as though she has a problem with alcohol  -\"just was going through a rough time\"  Works at [employer]- now works from home- on the phone a lot       Patient Active Problem List   Diagnosis     UTI (urinary tract infection)     Migraine with aura and without status migrainosus, not intractable     Moderate major depression (H)     Anxiety     Current Outpatient Medications   Medication             venlafaxine (EFFEXOR-XR) 75 MG 24 hr capsule     No current facility-administered medications for this visit.             Reviewed and updated as needed this visit by Provider  Tobacco  Allergies  Meds  Problems  Med Hx  Surg Hx  Fam Hx  Soc Hx          Review of Systems   ROS COMP: Constitutional, HEENT, cardiovascular, pulmonary, gi and gu systems are negative, except as otherwise noted.      Objective    /86 (BP Location: Right arm, Patient Position: Chair, Cuff Size: Adult Regular)   Pulse 70   Temp 97.7  F (36.5  C) (Oral)   Resp 17   Ht 1.727 m (5' 8\")   Wt 73.5 kg (162 lb)   LMP 12/02/2019 (Approximate)   SpO2 96%   Breastfeeding No   BMI 24.63 kg/m    Body mass index is 24.63 kg/m .  Physical Exam   GENERAL: healthy, alert and no distress  EYES: Eyes grossly normal to inspection, PERRL and conjunctivae and sclerae normal  HENT: ear canals and TM's normal, nose and mouth without ulcers or lesions  NECK: no adenopathy, no asymmetry, masses, or scars and thyroid normal to palpation  RESP: lungs clear to auscultation - no rales, rhonchi or wheezes  CV: regular rate and rhythm, normal S1 S2, no S3 or S4, no murmur, click or rub, no peripheral edema and peripheral pulses strong  MS: no gross " musculoskeletal defects noted, no edema  NEURO: Normal strength and tone, sensory exam grossly normal, mentation intact, DTR's normal and symmetric bilaterally , gait normal including heel/toe/tandem walking and Romberg normal    Diagnostic Test Results:  none         Assessment & Plan     1. Migraine without aura and with status migrainosus, not intractable  Trial of imitrex. Side effects discussed follow up with us in one month  FMLA form completed   - SUMAtriptan (IMITREX) 50 MG tablet; Take 1 tablet (50 mg) by mouth at onset of headache for migraine May repeat in 2 hours. Max 4 tablets/24 hours.  Dispense: 18 tablet; Refill: 0    2. Daily headache  Improves with ibuprofen but daily headache Trial of amitriptyline at bedtime side effects discussed follow up with us in one month  - amitriptyline (ELAVIL) 25 MG tablet; Take 1 tablet (25 mg) by mouth At Bedtime  Dispense: 30 tablet; Refill: 1    3. Insomnia, unspecified type  Not resolved with melatonin Trial of amitriptyline and follow up with us in one month   - amitriptyline (ELAVIL) 25 MG tablet; Take 1 tablet (25 mg) by mouth At Bedtime  Dispense: 30 tablet; Refill: 1    4. Anxiety  Recommended psychotherapy.  Symptoms not at goal.  She declines increasing effexor   - MENTAL HEALTH REFERRAL  - Adult; Outpatient Treatment; Individual/Couples/Family/Group Therapy/Health Psychology; [facility]: [facility] [phone]; We will contact you to schedule the appointment or please call with any questions    5. Moderate major depression (H)  Symptoms not at goal.  She declines increasing effexor.  Referred to psychology and follow up with us in one month  - MENTAL HEALTH REFERRAL  - Adult; Outpatient Treatment; Individual/Couples/Family/Group Therapy/Health Psychology; [facility] [phone]; We will contact you to schedule the appointment or please call with any questions       Patient Instructions   Take imitrex at onset of  headache  Take amitriptyline at bedtime to help prevent headache  Continue effexor at current dose  Follow up with us in one month  Call with side effects or concerns.        Return in about 4 weeks (around 1/13/2020), or if symptoms worsen or fail to improve.    Amanda Avila PA-C  [facility]

## 2019-12-17 ASSESSMENT — ANXIETY QUESTIONNAIRES: GAD7 TOTAL SCORE: 4

## 2020-09-03 ENCOUNTER — TELEPHONE (OUTPATIENT)
Dept: FAMILY MEDICINE | Facility: CLINIC | Age: 40
End: 2020-09-03

## 2020-09-03 ENCOUNTER — VIRTUAL VISIT (OUTPATIENT)
Dept: FAMILY MEDICINE | Facility: CLINIC | Age: 40
End: 2020-09-03
Payer: MEDICAID

## 2020-09-03 DIAGNOSIS — F32.1 MODERATE MAJOR DEPRESSION (H): Primary | ICD-10-CM

## 2020-09-03 DIAGNOSIS — G43.001 MIGRAINE WITHOUT AURA AND WITH STATUS MIGRAINOSUS, NOT INTRACTABLE: ICD-10-CM

## 2020-09-03 PROBLEM — R87.612 LGSIL ON PAP SMEAR OF CERVIX: Status: ACTIVE | Noted: 2017-05-01

## 2020-09-03 PROBLEM — L21.9 SEBORRHEIC DERMATITIS OF SCALP: Status: ACTIVE | Noted: 2020-09-03

## 2020-09-03 PROBLEM — L65.8 FEMALE PATTERN HAIR LOSS: Status: ACTIVE | Noted: 2020-09-03

## 2020-09-03 PROBLEM — Z78.9 NONIMMUNE TO HEPATITIS B VIRUS: Status: ACTIVE | Noted: 2020-09-03

## 2020-09-03 PROCEDURE — 96127 BRIEF EMOTIONAL/BEHAV ASSMT: CPT | Performed by: NURSE PRACTITIONER

## 2020-09-03 PROCEDURE — 99213 OFFICE O/P EST LOW 20 MIN: CPT | Mod: 95 | Performed by: NURSE PRACTITIONER

## 2020-09-03 RX ORDER — FLUOXETINE 10 MG/1
10 CAPSULE ORAL DAILY
Qty: 90 CAPSULE | Refills: 1 | Status: SHIPPED | OUTPATIENT
Start: 2020-09-03 | End: 2021-04-26

## 2020-09-03 ASSESSMENT — PATIENT HEALTH QUESTIONNAIRE - PHQ9: SUM OF ALL RESPONSES TO PHQ QUESTIONS 1-9: 7

## 2020-09-03 NOTE — PROGRESS NOTES
Unable to locate DAP in the letters section.  Aiyana Guillermo MA  [facility]  2nd Floor  Primary Care

## 2020-09-03 NOTE — PROGRESS NOTES
"Radha Mo is a 40 year old female who is being evaluated via a billable telephone visit.      The patient has been notified of following:     \"This telephone visit will be conducted via a call between you and your physician/provider. We have found that certain health care needs can be provided without the need for a physical exam.  This service lets us provide the care you need with a short phone conversation.  If a prescription is necessary we can send it directly to your pharmacy.  If lab work is needed we can place an order for that and you can then stop by our lab to have the test done at a later time.    Telephone visits are billed at different rates depending on your insurance coverage. During this emergency period, for some insurers they may be billed the same as an in-person visit.  Please reach out to your insurance provider with any questions.    If during the course of the call the physician/provider feels a telephone visit is not appropriate, you will not be charged for this service.\"    Patient has given verbal consent for Telephone visit?  Yes    What phone number would you like to be contacted at? [phone]    How would you like to obtain your AVS? Sherif Xiao     Radha Mo is a 40 year old female who presents via phone visit today for the following health issues:    HPI    Depression Followup    How are you doing with your depression since your last visit? Improved since stopping taking venlafaxine    Are you having other symptoms that might be associated with depression? Yes:  some vertigo    Have you had a significant life event?  Job Concerns     Are you feeling anxious or having panic attacks?   Yes:  mild anxiety in am    Do you have any concerns with your use of alcohol or other drugs? No  She reports that she was let go from her job on 8/24/30 and now needs paperwork completed for unemployment.  She was on Effexor for her depression but stopped it 6 days ago and is feeling " much better.  She'd been on it for about 1.5 years. She noted some vertigo since stopping the Effexor.  Prior to being on Effexor, she'd taken Lexapro  Social History     Tobacco Use     Smoking status: Never Smoker     Smokeless tobacco: Never Used   Substance Use Topics     Alcohol use: Not Currently     Drug use: No     PHQ 4/17/2019 12/16/2019   PHQ-9 Total Score 4 9   Q9: Thoughts of better off dead/self-harm past 2 weeks Not at all Not at all     MICHAEL-7 SCORE 4/17/2019 12/16/2019   Total Score 3 4     Last PHQ-9 9/3/2020   1.  Little interest or pleasure in doing things 1   2.  Feeling down, depressed, or hopeless 0   3.  Trouble falling or staying asleep, or sleeping too much 2   4.  Feeling tired or having little energy 2   5.  Poor appetite or overeating 0   6.  Feeling bad about yourself 0   7.  Trouble concentrating 1   8.  Moving slowly or restless 1   Q9: Thoughts of better off dead/self-harm past 2 weeks 0   PHQ-9 Total Score 7   Difficulty at work, home, or with people Not difficult at all     In the past two weeks have you had thoughts of suicide or self-harm?  No.    Do you have concerns about your personal safety or the safety of others?   No    Suicide Assessment Five-step Evaluation and Treatment (SAFE-T)      How many servings of fruits and vegetables do you eat daily?  2-3    On average, how many sweetened beverages do you drink each day (Examples: soda, juice, sweet tea, etc.  Do NOT count diet or artificially sweetened beverages)?   1    How many days per week do you exercise enough to make your heart beat faster? 7    How many minutes a day do you exercise enough to make your heart beat faster? 60 or more    How many days per week do you miss taking your medication? 0         Migraine     Since your last clinic visit, how have your headaches changed?  Improved    How often are you getting headaches or migraines? 1-2 times/month     Are you able to do normal daily activities when you have a  migraine? Yes    Are you taking rescue/relief medications? (Select all that apply) sumatriptan (Imitrex)    How helpful is your rescue/relief medication?  I get some relief    Are you taking any medications to prevent migraines? (Select all that apply)  No-stopped Amitriptyline over a month ago and is feelg much improved.     In the past 4 weeks, how often have you gone to urgent care or the emergency room because of your headaches?  0  She reports having missed several days of work due to migraines and didn't have enough hours for FMLA so her employer let her go.  Since stopping the Amitriptyline and Effexor, her migraines have almost completely gone away   .    How many servings of fruits and vegetables do you eat daily?  2-3    On average, how many sweetened beverages do you drink each day (Examples: soda, juice, sweet tea, etc.  Do NOT count diet or artificially sweetened beverages)?   0    How many days per week do you exercise enough to make your heart beat faster? 3 or less    How many minutes a day do you exercise enough to make your heart beat faster? 30 - 60    How many days per week do you miss taking your medication? 0      Review of Systems   Constitutional, HEENT, cardiovascular, pulmonary, gi and gu systems are negative, except as otherwise noted.       Objective          Vitals:  No vitals were obtained today due to virtual visit.    healthy, alert and no distress  PSYCH: Alert and oriented times 3; coherent speech, normal   rate and volume, able to articulate logical thoughts, able   to abstract reason, no tangential thoughts, no hallucinations   or delusions  Her affect is normal and pleasant  RESP: No cough, no audible wheezing, able to talk in full sentences  Remainder of exam unable to be completed due to telephone visits          Assessment & Plan     Moderate major depression (H)  Starting Prozac at 1 mg sergio, Discussed need for gradual increase of SSRI dose over time, titrating to effect.   Reviewed potential for initial side effects (such as headache, GI symptoms, and dry mouth) that will likely subside after a week or so, but that therapeutic effects will likely take 1-2 weeks - so it's important to stick with medication for at least a month to adequately gauge effect.  Notify me of any significant side effects.  Discussed that treatment with SSRI medications requires a minimum commitment of 9-12 months; shorter courses are associated with rebound symptoms.  Discussed potential long-term side effects including sexual side effects. Virtual visti in 3 weeks for medication check, sooner if new,worsening symptoms.  - FLUoxetine (PROZAC) 10 MG capsule  Dispense: 90 capsule; Refill: 1    Migraine without aura and with status migrainosus, not intractable  Improved since stopping Effexor.  She also stopped Amitriptyline so will monitor going forward. Amitriptyline caused drowsiness and feeling dopey in the am so could consider alternate preventative medication. If needed.         Work on weight loss  Regular exercise  See Patient Instructions    No follow-ups on file.    RICARDO Ceja [facility]    Phone call duration:  24 minutes

## 2020-09-03 NOTE — Clinical Note
Pls print DAP and AVS and bring to .  She is bringing unemployment papers for completion- pls fax to me when she brings them in.

## 2020-09-03 NOTE — TELEPHONE ENCOUNTER
.Reason for Call:  Form, our goal is to have forms completed with 72 hours, however, some forms may require a visit or additional information.    Type of letter, form or note:  Unemployment Insurance Form    Who is the form from?: [place]     Where did the form come from: [place] dept. [employer] employment and economic development  What clinic location was the form placed at?:[facility]  Where the form was placed: [address]  What number is listed as a contact on the form?: cell #       Additional comments:     Call taken on 9/3/2020 at 5:12 PM by Suyapa Raymundo

## 2020-09-03 NOTE — PATIENT INSTRUCTIONS
Patient Education     Fluoxetine capsules or tablets (Depression/Mood Disorders)  Brand Name: Prozac  What is this medicine?  FLUOXETINE (floo OX e teen) belongs to a class of drugs known as selective serotonin reuptake inhibitors (SSRIs). It helps to treat mood problems such as depression, obsessive compulsive disorder, and panic attacks. It can also treat certain eating disorders.  How should I use this medicine?  Take this medicine by mouth with a glass of water. Follow the directions on the prescription label. You can take this medicine with or without food. Take your medicine at regular intervals. Do not take it more often than directed. Do not stop taking this medicine suddenly except upon the advice of your doctor. Stopping this medicine too quickly may cause serious side effects or your condition may worsen.  A special MedGuide will be given to you by the pharmacist with each prescription and refill. Be sure to read this information carefully each time.  Talk to your pediatrician regarding the use of this medicine in children. While this drug may be prescribed for children as young as 7 years for selected conditions, precautions do apply.  What side effects may I notice from receiving this medicine?  Side effects that you should report to your doctor or health care professional as soon as possible:    allergic reactions like skin rash, itching or hives, swelling of the face, lips, or tongue    anxious    black, tarry stools    breathing problems    changes in vision    confusion    elevated mood, decreased need for sleep, racing thoughts, impulsive behavior    eye pain    fast, irregular heartbeat    feeling faint or lightheaded, falls    feeling agitated, angry, or irritable    hallucination, loss of contact with reality    loss of balance or coordination    loss of memory    painful or prolonged erections    restlessness, pacing, inability to keep still    seizures    stiff muscles    suicidal thoughts  or other mood changes    trouble sleeping    unusual bleeding or bruising    unusually weak or tired    vomiting  Side effects that usually do not require medical attention (report to your doctor or health care professional if they continue or are bothersome):    change in appetite or weight    change in sex drive or performance    diarrhea    dry mouth    headache    increased sweating    nausea    tremors  What may interact with this medicine?  Do not take this medicine with any of the following medications:    other medicines containing fluoxetine, like Sarafem or Symbyax    cisapride    linezolid    MAOIs like Carbex, Eldepryl, Marplan, Nardil, and Parnate    methylene blue (injected into a vein)    pimozide    thioridazine  This medicine may also interact with the following medications:    alcohol    amphetamines    aspirin and aspirin-like medicines    carbamazepine    certain medicines for depression, anxiety, or psychotic disturbances    certain medicines for migraine headaches like almotriptan, eletriptan, frovatriptan, naratriptan, rizatriptan, sumatriptan, zolmitriptan    digoxin    diuretics    fentanyl    flecainide    furazolidone    isoniazid    lithium    medicines for sleep    medicines that treat or prevent blood clots like warfarin, enoxaparin, and dalteparin    NSAIDs, medicines for pain and inflammation, like ibuprofen or naproxen    phenytoin    procarbazine    propafenone    rasagiline    ritonavir    supplements like [place]'s wort, kava kava, valerian    tramadol    tryptophan    vinblastine  What if I miss a dose?  If you miss a dose, skip the missed dose and go back to your regular dosing schedule. Do not take double or extra doses.  Where should I keep my medicine?  Keep out of the reach of children.  Store at room temperature between 15 and 30 degrees C (59 and 86 degrees F). Throw away any unused medicine after the expiration date.  What should I tell my health care provider before I  take this medicine?  They need to know if you have any of these conditions:    bipolar disorder or a family history of bipolar disorder    bleeding disorders    glaucoma    heart disease    liver disease    low levels of sodium in the blood    seizures    suicidal thoughts, plans, or attempt; a previous suicide attempt by you or a family member    take MAOIs like Carbex, Eldepryl, Marplan, Nardil, and Parnate    take medicines that treat or prevent blood clots    thyroid disease    an unusual or allergic reaction to fluoxetine, other medicines, foods, dyes, or preservatives    pregnant or trying to get pregnant    breast-feeding  What should I watch for while using this medicine?  Tell your doctor if your symptoms do not get better or if they get worse. Visit your doctor or health care professional for regular checks on your progress. Because it may take several weeks to see the full effects of this medicine, it is important to continue your treatment as prescribed by your doctor.  Patients and their families should watch out for new or worsening thoughts of suicide or depression. Also watch out for sudden changes in feelings such as feeling anxious, agitated, panicky, irritable, hostile, aggressive, impulsive, severely restless, overly excited and hyperactive, or not being able to sleep. If this happens, especially at the beginning of treatment or after a change in dose, call your health care professional.  You may get drowsy or dizzy. Do not drive, use machinery, or do anything that needs mental alertness until you know how this medicine affects you. Do not stand or sit up quickly, especially if you are an older patient. This reduces the risk of dizzy or fainting spells. Alcohol may interfere with the effect of this medicine. Avoid alcoholic drinks.  Your mouth may get dry. Chewing sugarless gum or sucking hard candy, and drinking plenty of water may help. Contact your doctor if the problem does not go away or is  severe.  This medicine may affect blood sugar levels. If you have diabetes, check with your doctor or health care professional before you change your diet or the dose of your diabetic medicine.  NOTE:This sheet is a summary. It may not cover all possible information. If you have questions about this medicine, talk to your doctor, pharmacist, or health care provider. Copyright  2019 [employer]

## 2020-09-03 NOTE — LETTER
My Depression Action Plan  Name: Radha Mo   Date of Birth 1980  Date: 9/4/2020    My doctor: [facility]   My clinic: [address] [id number] [phone]          GREEN    ZONE   Good Control    What it looks like:     Things are going generally well. You have normal ups and downs. You may even feel depressed from time to time, but bad moods usually last less than a day.   What you need to do:  1. Continue to care for yourself (see self care plan)  2. Check your depression survival kit and update it as needed  3. Follow your physician s recommendations including any medication.  4. Do not stop taking medication unless you consult with your physician first.           YELLOW         ZONE Getting Worse    What it looks like:     Depression is starting to interfere with your life.     It may be hard to get out of bed; you may be starting to isolate yourself from others.    Symptoms of depression are starting to last most all day and this has happened for several days.     You may have suicidal thoughts but they are not constant.   What you need to do:     1. Call your care team. Your response to treatment will improve if you keep your care team informed of your progress. Yellow periods are signs an adjustment may need to be made.     2. Continue your self-care.  Just get dressed and ready for the day.  Don't give yourself time to talk yourself out of it.    3. Talk to someone in your support network.    4. Open up your Depression Self-Care Plan/Wellness Kit.           RED    ZONE Medical Alert - Get Help    What it looks like:     Depression is seriously interfering with your life.     You may experience these or other symptoms: You can t get out of bed most days, can t work or engage in other necessary activities, you have trouble taking care of basic hygiene, or basic responsibilities, thoughts of suicide or death that  will not go away, self-injurious behavior.     What you need to do:  1. Call your care team and request a same-day appointment. If they are not available (weekends or after hours) call your local crisis line, emergency room or 911.            Depression Self-Care Plan / Wellness Kit    Self-Care for Depression  Here s the deal. Your body and mind are really not as separate as most people think.  What you do and think affects how you feel and how you feel influences what you do and think. This means if you do things that people who feel good do, it will help you feel better.  Sometimes this is all it takes.  There is also a place for medication and therapy depending on how severe your depression is, so be sure to consult with your medical provider and/ or Behavioral Health Consultant if your symptoms are worsening or not improving.     In order to better manage my stress, I will:    Exercise  Get some form of exercise, every day. This will help reduce pain and release endorphins, the  feel good  chemicals in your brain. This is almost as good as taking antidepressants!  This is not the same as joining a gym and then never going! (they count on that by the way ) It can be as simple as just going for a walk or doing some gardening, anything that will get you moving.      Hygiene   Maintain good hygiene (get out of bed in the morning, make your bed, brush your teeth, take a shower, and get dressed like you were going to work, even if you are unemployed).  If your clothes don't fit try to get ones that do.    Diet  Strive to eat foods that are good for me, drink plenty of water, and avoid excessive sugar, caffeine, alcohol, and other mood-altering substances.  Some foods that are helpful in depression are: complex carbohydrates, B vitamins, flaxseed, fish or fish oil, fresh fruits and vegetables.    Psychotherapy  Agree to participate in Individual Therapy (if recommended).    Medication  If prescribed medications, I  agree to take them.  Missing doses can result in serious side effects.  I understand that drinking alcohol, or other illicit drug use, may cause potential side effects.  I will not stop my medication abruptly without first discussing it with my provider.    Staying Connected With Others  Stay in touch with my friends, family members, and my primary care provider/team.    Use your imagination  Be creative.  We all have a creative side; it doesn t matter if it s oil painting, sand castles, or mud pies! This will also kick up the endorphins.    Witness Beauty  (AKA stop and smell the roses) Take a look outside, even in mid-winter. Notice colors, textures. Watch the squirrels and birds.     Service to others  Be of service to others.  There is always someone else in need.  By helping others we can  get out of ourselves  and remember the really important things.  This also provides opportunities for practicing all the other parts of the program.    Humor  Laugh and be silly!  Adjust your TV habits for less news and crime-drama and more comedy.    Control your stress  Try breathing deep, massage therapy, biofeedback, and meditation. Find time to relax each day.     Crisis Text Line  http://www.crisistextline.org    The Crisis Text Line serves anyone, in any type of crisis, providing access to free, 24/7 support and information via the medium people already use and trust:    Here's how it works:  1.  Text 705-108 from anywhere in the [place], anytime, about any type of crisis.  2.  A live, trained Crisis Counselor receives the text and responds quickly.  3.  The volunteer Crisis Counselor will help you move from a 'hot moment to a cool moment'.    My support system    Clinic Contact:  Phone number:    Contact 1:  Phone number:    Contact 2:  Phone number:    Buddhist/:  Phone number:    Therapist:  Phone number:    Local crisis center:    Phone number:    Other community support:  Phone number:

## 2020-09-04 NOTE — PROGRESS NOTES
Carlie Tomlinson APRN CNP Pottenger, Janet L, MA               I just did the letter.  Sorry!!  Can you print it?  I can't print from [employer]- I'll put in a ticket for t.      Printed DAP and mailing to the patient.  Aiyana Guillermo MA  [facility]  2nd Floor  Primary Care

## 2020-09-04 NOTE — TELEPHONE ENCOUNTER
Received form and placed in provider's red folder for review for her return next week.  Aiyana Guillermo MA  [facility]  2nd Floor  Primary Care

## 2020-09-08 NOTE — TELEPHONE ENCOUNTER
Faxed signed form to [employer], [phone], right fax confirmed at 11:45 am today, 9/8/2020. Copy to TC and abstracting.  Aiyana Guillermo MA  [facility]  2nd Floor  Primary Care

## 2020-11-12 ENCOUNTER — VIRTUAL VISIT (OUTPATIENT)
Dept: FAMILY MEDICINE | Facility: CLINIC | Age: 40
End: 2020-11-12
Payer: MEDICAID

## 2020-11-12 DIAGNOSIS — R07.9 INTERMITTENT CHEST PAIN: ICD-10-CM

## 2020-11-12 DIAGNOSIS — Z20.822 EXPOSURE TO COVID-19 VIRUS: Primary | ICD-10-CM

## 2020-11-12 PROCEDURE — 99442 PR PHYSICIAN TELEPHONE EVALUATION 11-20 MIN: CPT | Performed by: NURSE PRACTITIONER

## 2020-11-12 NOTE — PROGRESS NOTES
"Radha Mo is a 40 year old female who is being evaluated via a billable telephone visit.      The patient has been notified of following:     \"This telephone visit will be conducted via a call between you and your physician/provider. We have found that certain health care needs can be provided without the need for a physical exam.  This service lets us provide the care you need with a short phone conversation.  If a prescription is necessary we can send it directly to your pharmacy.  If lab work is needed we can place an order for that and you can then stop by our lab to have the test done at a later time.    Telephone visits are billed at different rates depending on your insurance coverage. During this emergency period, for some insurers they may be billed the same as an in-person visit.  Please reach out to your insurance provider with any questions.    If during the course of the call the physician/provider feels a telephone visit is not appropriate, you will not be charged for this service.\"    Patient has given verbal consent for Telephone visit?  Yes    What phone number would you like to be contacted at? .      How would you like to obtain your AVS? Sherif Xiao     Radha Mo is a 40 year old female who presents via phone visit today for the following health issues:    HPI       Concern for COVID-19  About how many days ago did these symptoms start? 1-2 weeks  Is this your first visit for this illness? Yes  In the 14 days before your symptoms started, have you had close contact with someone with COVID-19 (Coronavirus)? Yes, I have been in contact with someone who has COVID-19/Coronavirus (confirmed by lab test).  Do you have a fever or chills? Yes, I felt feverish or had chills  Are you having new or worsening difficulty breathing? Yes  With activity  Please describe what kind of difficulty you are having breathing:Mild dyspnea (decreased exercise tolerance, able to do ADLs without " difficulty)  Do you have new or worsening cough? Yes, it's a dry cough.   Have you had any new or unexplained body aches? No    Have you experienced any of the following NEW symptoms?    Headache: YES    Sore throat: YES    Loss of taste or smell: No    Chest pain: YES    Diarrhea: No    Rash: No  What treatments have you tried? nothing  Who do you live with? Son with with him every other weekend  Are you, or a household member, a healthcare worker or a ? No  Do you live in a nursing home, group home, or shelter? No  Do you have a way to get food/medications if quarantined? Yes, I have a friend or family member who can help me.            Depression Followup    How are you doing with your depression since your last visit? Improved     Are you having other symptoms that might be associated with depression? No    Have you had a significant life event?  OTHER: COVID concerns     Are you feeling anxious or having panic attacks?   Yes:  .    Do you have any concerns with your use of alcohol or other drugs? No     She is currently taking 10 mg Prozac for her depression, changed from Effexor and is doing OK on the Prozac.    Her sons Father tested + for COVID on 11/9/20.  She is concerned for possible COVID- is having palpitations, intermittent chest pain for the last 1-2 weeks.  She endorsed 4 hours of chest pain on Sunday that resolved with tylenol.  She also admits to some heartburn but has never been diagnosed with reflux.  She has taken Tums with food symptom relief.  She denies palpitations, states HR is between 60 and 100, no fever or chills. She denies FH of hear disease.  Social History     Tobacco Use     Smoking status: Never Smoker     Smokeless tobacco: Never Used   Substance Use Topics     Alcohol use: Not Currently     Drug use: No     PHQ 4/17/2019 12/16/2019 9/3/2020   PHQ-9 Total Score 4 9 7   Q9: Thoughts of better off dead/self-harm past 2 weeks Not at all Not at all Not at all     MICHAEL-7  SCORE 4/17/2019 12/16/2019   Total Score 3 4     Last PHQ-9 9/3/2020   1.  Little interest or pleasure in doing things 1   2.  Feeling down, depressed, or hopeless 0   3.  Trouble falling or staying asleep, or sleeping too much 2   4.  Feeling tired or having little energy 2   5.  Poor appetite or overeating 0   6.  Feeling bad about yourself 0   7.  Trouble concentrating 1   8.  Moving slowly or restless 1   Q9: Thoughts of better off dead/self-harm past 2 weeks 0   PHQ-9 Total Score 7   Difficulty at work, home, or with people Not difficult at all     MICHAEL-7  12/16/2019   1. Feeling nervous, anxious, or on edge 1   2. Not being able to stop or control worrying 0   3. Worrying too much about different things 1   4. Trouble relaxing 1   5. Being so restless that it is hard to sit still 0   6. Becoming easily annoyed or irritable 1   7. Feeling afraid, as if something awful might happen 0   MICHAEL-7 Total Score 4   If you checked any problems, how difficult have they made it for you to do your work, take care of things at home, or get along with other people? Somewhat difficult     In the past two weeks have you had thoughts of suicide or self-harm?  No.    Do you have concerns about your personal safety or the safety of others?   No    Suicide Assessment Five-step Evaluation and Treatment (SAFE-T)      How many servings of fruits and vegetables do you eat daily?  2-3    On average, how many sweetened beverages do you drink each day (Examples: soda, juice, sweet tea, etc.  Do NOT count diet or artificially sweetened beverages)?   1    How many days per week do you exercise enough to make your heart beat faster? 4    How many minutes a day do you exercise enough to make your heart beat faster? 30 - 60    How many days per week do you miss taking your medication? 0      Review of Systems   Constitutional, HEENT, cardiovascular, pulmonary, gi and gu systems are negative, except as otherwise noted.       Objective           Vitals:  No vitals were obtained today due to virtual visit.    healthy, alert and no distress  PSYCH: Alert and oriented times 3; coherent speech, normal   rate and volume, able to articulate logical thoughts, able   to abstract reason, no tangential thoughts, no hallucinations   or delusions  Her affect is normal and pleasant  RESP: No cough, no audible wheezing, able to talk in full sentences  Remainder of exam unable to be completed due to telephone visits    No results found for any visits on 11/12/20.        Assessment/Plan:    Assessment & Plan     Exposure to COVID-19 virus  Getting COVID PCR.  - Symptomatic COVID-19 Virus (Coronavirus) by PCR    Intermittent chest pain  If chest pain persists, she needs to be seen in clinic or in the  as I cannot evaluate this over the phone.  She verbalizes understanding and intent to comply.,  She is currently asymptomatic.          See Patient Instructions    Return in about 3 days (around 11/15/2020), or if symptoms worsen or fail to improve, for with any available provider.    RICARDO Ceja [facility]    Phone call duration:  17:50 minutes

## 2020-11-12 NOTE — PATIENT INSTRUCTIONS
"  Patient Education     Coronavirus Disease 2019 (COVID-19): Prevention  The best prevention is to have no contact with the SARS-CoV-2 virus. There is no vaccine yet.  It s important to keep up on vaccines for other illnesses, including flu and pnuemonia. This is even more true if you re at higher risk for severe illness. Everyone 6 months and older should get a yearly flu vaccine, with rare exceptions.  Cancel travel and other outings  Stay informed about COVID-19 in your area. Follow local instructions. School, sporting events and other activities may be cancelled. You may need to avoid public gatherings.  Stay at least 6 feet away from others as much as possible. This is called \"social distancing.\" You may also be asked to stay at home and isolate yourself. You may hear terms like \"self-isolate, \"quarantine,\"  stay at home,   shelter in place,  and  lockdown.   Don t travel to areas with a COVID-19 outbreak. Don t go on a cruise. It s best to cancel any non-essential travel right now. For the most up-to-date travel advisories, visit the CDC website at www.cdc.gov/coronavirus/2019-ncov/travelers.  When you re at home    Wash your hands often. Use soap and clean, running water for at least 20 seconds. Or, use hand  that has at least 60% alcohol.    Don t touch your eyes, nose, or mouth unless you have clean hands.    Don t kiss someone who is sick.    If you need to cough or sneeze, do it into a tissue. Then throw the tissue into the trash. If you don t have tissues, cough or sneeze into the bend of your elbow.    Try to avoid \"high-touch\" surfaces, like doorknobs, handles, light switches, desks, printers, phones, kitchen counters, tables and bathroom surfaces. Clean these often with disinfectant (read the label for instructions). For cleaning tips, go to www.cdc.gov/coronavirus/2019-ncov/prepare/cleaning-disinfection.html.    Check your home supplies. Consider keeping a 2-week supply of medicines, food, " "and other needed household items.    Make a plan for childcare, work, and ways to stay in touch with others. Know who will help you if you get sick.    Don t be around people who are sick.    Don t share eating or drinking utensils with sick people.    Wash your hands after touching animals. Don t touch animals that may be sick.    If you leave home      Stay at least 6 feet away from all people.    Try to avoid \"high-touch\" public surfaces, like doorknobs, handles, and light switches. If you need to touch these, clean them first with a disinfecting wipe. Or, touch them using a tissue or paper towel.    Use hand  often. Make sure it has at least 60% alcohol.    Don t touch your eyes, nose, or mouth unless you have clean hands.    If you need to cough or sneeze, do it into a tissue. Then, throw the tissue into the trash. If you don t have tissues, cough or sneeze into the bend of your elbow.    Avoid public gatherings. If you do attend public gatherings, stay at least 6 feet away from others. Don t share food, water bottles, or other personal items.    Anyone over age 2 should wear a cloth face mask in public, especially when it s hard to socially distance. This includes public transit, public protests and marches, and crowded stores, bars, and restaurants.  ? The mask should cover both your nose and mouth. You may need to make your own mask using a bandana, T-shirt, or other cloth. See the CDC s instructions on how to make a mask.  ? Face masks are most likely to reduce the spread of COVID-19 when they are widely used by people who are out in the public.    Certain people should not wear a face mask. These include:  ? Children under 2 years old  ? Anyone with a health, developmental, or mental health condition that can be made worse by wearing a mask  ? Anyone who is unconscious or unable to remove the face covering without help. See the CDC s guidance on who should not wear a face mask.  If you are at a " work site    Follow all of the instructions above.    If you feel sick in any way, go home and stay home.    Tell your manager if you are well but live with someone who has COVID-19.    Wash your hands often with soap and clean, running water for at least 20 seconds. Or, use hand  with at least 60% alcohol.    Don t shake hands. Don t have in-person meetings. (Meet over phone or video, if possible.)    Don t use other people s desks, offices, phones or equipment (pens, keyboards, eating or drinking utensils, etc.), if possible.    Use office sima one person at a time. Don t share coffee, tea or food in the office. Don t have meals in groups.    Wear a mask over your nose and mouth.    Clean work surfaces often with disinfectant. These include desks, photocopiers, printers, phones, kitchen counters, fridge door handles, bathroom surfaces, and others.    If you ve been around someone with COVID-19  In the past 14 days, if you ve been around someone who has (or may have) COVID-19:    Contact your care team to ask for advice. Follow all instructions. You may need to stay home and limit your activities for up to 2 weeks.    Take your temperature every morning and evening for at least 14 days. This is to check for fever. Keep a record of the readings.    Watch for symptoms of the virus. (See the CDC s symptom .) If you have symptoms, contact your care team.    Stay home if you re sick for any reason. If you need to go to a clinic or hospital, call ahead to let them know you re coming.    If you ve had COVID-19 in the last 3 months, but now you re symptom-free, your limits are different: You don t need to self-isolate. You don t need to be re-tested for COVID-19, unless you have symptoms of the virus. (If you do develop symptoms, stay home.)    If you had COVID-19 more than 3 months ago, and you ve been exposed again: Treat it like you ve never had COVID-19. Stay home, limit your contact with others,  call your care team, and check for symptoms.  When to contact your care team  Call your care team if you think you have COVID-19 symptoms. These can include fever, cough, trouble breathing, body aches, headaches, chills or repeated shaking with chills, sore throat, loss of taste or smell, or diarrhea (loose, watery poops). Don t go to a clinic or hospital before speaking to your care team.  Last modified date: 10/13/2020  Karthik last reviewed this educational content on 4/1/2020  This information has been modified by your health care provider with permission from the publisher.    4114-8133 [email or web address]. [address] [place] [address], [place] [address]. All rights reserved. This information is not intended as a substitute for professional medical care. Always follow your healthcare professional s instructions.

## 2020-11-13 ENCOUNTER — MYC MEDICAL ADVICE (OUTPATIENT)
Dept: FAMILY MEDICINE | Facility: CLINIC | Age: 40
End: 2020-11-13

## 2020-11-16 DIAGNOSIS — Z20.822 EXPOSURE TO COVID-19 VIRUS: ICD-10-CM

## 2020-11-16 PROCEDURE — U0003 INFECTIOUS AGENT DETECTION BY NUCLEIC ACID (DNA OR RNA); SEVERE ACUTE RESPIRATORY SYNDROME CORONAVIRUS 2 (SARS-COV-2) (CORONAVIRUS DISEASE [COVID-19]), AMPLIFIED PROBE TECHNIQUE, MAKING USE OF HIGH THROUGHPUT TECHNOLOGIES AS DESCRIBED BY CMS-2020-01-R: HCPCS | Performed by: NURSE PRACTITIONER

## 2020-11-17 LAB
SARS-COV-2 RNA SPEC QL NAA+PROBE: NOT DETECTED
SPECIMEN SOURCE: NORMAL

## 2020-11-30 ENCOUNTER — OFFICE VISIT (OUTPATIENT)
Dept: FAMILY MEDICINE | Facility: CLINIC | Age: 40
End: 2020-11-30
Payer: MEDICAID

## 2020-11-30 VITALS
TEMPERATURE: 97.9 F | BODY MASS INDEX: 25.85 KG/M2 | WEIGHT: 170 LBS | HEART RATE: 74 BPM | DIASTOLIC BLOOD PRESSURE: 83 MMHG | SYSTOLIC BLOOD PRESSURE: 131 MMHG | OXYGEN SATURATION: 96 %

## 2020-11-30 DIAGNOSIS — F41.9 ANXIETY: Primary | ICD-10-CM

## 2020-11-30 DIAGNOSIS — Z20.822 ENCOUNTER FOR LABORATORY TESTING FOR COVID-19 VIRUS: ICD-10-CM

## 2020-11-30 DIAGNOSIS — Z11.59 NEED FOR HEPATITIS C SCREENING TEST: ICD-10-CM

## 2020-11-30 DIAGNOSIS — R10.13 DYSPEPSIA: ICD-10-CM

## 2020-11-30 DIAGNOSIS — R07.89 CHEST TIGHTNESS: ICD-10-CM

## 2020-11-30 PROBLEM — Z12.4 SCREENING FOR MALIGNANT NEOPLASM OF CERVIX: Status: ACTIVE | Noted: 2020-11-30

## 2020-11-30 PROCEDURE — U0003 INFECTIOUS AGENT DETECTION BY NUCLEIC ACID (DNA OR RNA); SEVERE ACUTE RESPIRATORY SYNDROME CORONAVIRUS 2 (SARS-COV-2) (CORONAVIRUS DISEASE [COVID-19]), AMPLIFIED PROBE TECHNIQUE, MAKING USE OF HIGH THROUGHPUT TECHNOLOGIES AS DESCRIBED BY CMS-2020-01-R: HCPCS | Performed by: NURSE PRACTITIONER

## 2020-11-30 PROCEDURE — 99214 OFFICE O/P EST MOD 30 MIN: CPT | Performed by: NURSE PRACTITIONER

## 2020-11-30 NOTE — PROGRESS NOTES
Subjective     Radha Mo is a 40 year old female who presents to clinic today for the following health issues:    HPI         Depression and Anxiety Follow-Up    How are you doing with your depression since your last visit? No change    How are you doing with your anxiety since your last visit?  Worsened     Are you having other symptoms that might be associated with depression or anxiety? Yes:  fatigue, chest pain and sleeping a lot more than usual    Have you had a significant life event? OTHER: pandemic, pt unsure if she had covid a couple months ago     Do you have any concerns with your use of alcohol or other drugs? No    Social History     Tobacco Use     Smoking status: Never Smoker     Smokeless tobacco: Never Used   Substance Use Topics     Alcohol use: Not Currently     Drug use: No     PHQ 4/17/2019 12/16/2019 9/3/2020   PHQ-9 Total Score 4 9 7   Q9: Thoughts of better off dead/self-harm past 2 weeks Not at all Not at all Not at all     MICHAEL-7 SCORE 4/17/2019 12/16/2019   Total Score 3 4     Last PHQ-9 9/3/2020   1.  Little interest or pleasure in doing things 1   2.  Feeling down, depressed, or hopeless 0   3.  Trouble falling or staying asleep, or sleeping too much 2   4.  Feeling tired or having little energy 2   5.  Poor appetite or overeating 0   6.  Feeling bad about yourself 0   7.  Trouble concentrating 1   8.  Moving slowly or restless 1   Q9: Thoughts of better off dead/self-harm past 2 weeks 0   PHQ-9 Total Score 7   Difficulty at work, home, or with people Not difficult at all     MICHAEL-7  12/16/2019   1. Feeling nervous, anxious, or on edge 1   2. Not being able to stop or control worrying 0   3. Worrying too much about different things 1   4. Trouble relaxing 1   5. Being so restless that it is hard to sit still 0   6. Becoming easily annoyed or irritable 1   7. Feeling afraid, as if something awful might happen 0   MICHAEL-7 Total Score 4   If you checked any problems, how difficult have  they made it for you to do your work, take care of things at home, or get along with other people? Somewhat difficult       Suicide Assessment Five-step Evaluation and Treatment (SAFE-T)    Migraine     Since your last clinic visit, how have your headaches changed?  Improved    How often are you getting headaches or migraines? 1-2 times per week      Are you able to do normal daily activities when you have a migraine? Yes    Are you taking rescue/relief medications? (Select all that apply) ibuprofen (Advil, Motrin)    How helpful is your rescue/relief medication?  I get total relief    Are you taking any medications to prevent migraines? (Select all that apply)  No    In the past 4 weeks, how often have you gone to urgent care or the emergency room because of your headaches?  0      How many servings of fruits and vegetables do you eat daily?  2-3 to 4 or more     On average, how many sweetened beverages do you drink each day (Examples: soda, juice, sweet tea, etc.  Do NOT count diet or artificially sweetened beverages)?   1 diet pop     How many days per week do you exercise enough to make your heart beat faster? 3 or less    How many minutes a day do you exercise enough to make your heart beat faster? 9 or less    How many days per week do you miss taking your medication? 0    Chest Pain  Onset/Duration: 3 weeks  Description:   Location: entire chest- not sure if it's related to anxiety or not  Character: sharp  Radiation: none  Duration: intermittent lasting seconds to a few minutes,   Intensity: moderate  Progression of Symptoms: same  Accompanying Signs & Symptoms:  Shortness of breath: no  Sweating: no  Nausea/vomiting: no  Lightheadedness: no  Palpitations: yes- not sure if related to anxiety or not  Fever/Chills: no  Cough: no           Heartburn: YES  History:   Family history of heart disease: no  Tobacco use: no  Previous similar symptoms: no   Precipitating factors:   Worse with exertion: no  Worse with  deep breaths: no           Related to eating: no           Better with burping: no  Alleviating factors: mild  Therapies tried and outcome: none  She had negatvie COVID screen 11/16/20 but her boyfriend,  Her son and his Father, and Grandfather were positive 3 weeks ago.  She requests another COVID screen today.    Review of Systems   Constitutional, HEENT, cardiovascular, pulmonary, gi and gu systems are negative, except as otherwise noted.      Objective    /83 (BP Location: Left arm, Patient Position: Chair, Cuff Size: Adult Regular)   Pulse 74   Temp 97.9  F (36.6  C) (Oral)   Wt 77.1 kg (170 lb)   SpO2 96%   BMI 25.85 kg/m    Body mass index is 25.85 kg/m .  Physical Exam   GENERAL: healthy, alert and no distress  EYES: Eyes grossly normal to inspection, PERRL and conjunctivae and sclerae normal  HENT: ear canals and TM's normal, nose and mouth without ulcers or lesions  NECK: no adenopathy, no asymmetry, masses, or scars and thyroid normal to palpation  RESP: lungs clear to auscultation - no rales, rhonchi or wheezes  CV: regular rate and rhythm, normal S1 S2, no S3 or S4, no murmur, click or rub, no peripheral edema and peripheral pulses strong  ABDOMEN: soft, nontender, no hepatosplenomegaly, no masses and bowel sounds normal  MS: no gross musculoskeletal defects noted, no edema  SKIN: no suspicious lesions or rashes  NEURO: Normal strength and tone, mentation intact and speech normal  BACK: no CVA tenderness, no paralumbar tenderness  PSYCH: mentation appears normal, affect normal/bright  LYMPH: normal ant/post cervical, supraclavicular nodes    Results for orders placed or performed in visit on 11/30/20   Symptomatic COVID-19 Virus (Coronavirus) by PCR     Status: None    Specimen: Nasopharyngeal   Result Value Ref Range    COVID-19 Virus PCR to [facility] - Source Nasopharyngeal     COVID-19 Virus PCR to [facility] - Result Not Detected            Assessment & Plan     Anxiety  Starting Prozac daily  and referring to Mental Health for counseling.Discussed need for gradual increase of SSRI dose over time, titrating to effect.  Reviewed potential for initial side effects (such as headache, GI symptoms, and dry mouth) that will likely subside after a week or so, but that therapeutic effects will likely take 1-2 weeks - so it's important to stick with medication for at least a month to adequately gauge effect.  Notify me of any significant side effects.  Discussed that treatment with SSRI medications requires a minimum commitment of 9-12 months; shorter courses are associated with rebound symptoms.  Discussed potential long-term side effects including sexual side effects. Ftrr 3 weeks for medication check, sooner if new/.worsening symptoms.    - FLUoxetine (PROZAC) 20 MG capsule  Dispense: 60 capsule; Refill: 1  - MENTAL HEALTH REFERRAL  - Adult; Outpatient Treatment; Individual/Couples/Family/Group Therapy/Health Psychology[place] [facility] [place] [facility] [phone]; We will contact you to schedule the appointment or please call with any questions    Encounter for laboratory testing for COVID-19 virus    - Symptomatic COVID-19 Virus (Coronavirus) by PCR  - Symptomatic COVID-19 Virus (Coronavirus) by PCR    Chest tightness  Patient is currently asymptomatic, has not had symptoms for about 2 days, discussed getting EKG but patient declines stating she thinks this is related to anxiety. She understands that should her symptoms return or worsen, she will need to be seen immediately in UC or in the ER.    Need for hepatitis C screening test    - Hepatitis C Screen Reflex to HCV RNA Quant and Genotype    Dyspepsia  Trial of Prilosec for dyspepsia, return to clinic if not improved, new, or worsening symptoms.   - omeprazole (PRILOSEC) 20 MG DR capsule  Dispense: 30 capsule; Refill: 1          See Patient Instructions    Return in about 2 weeks (around 12/14/2020), or if symptoms worsen or fail to improve, for Follow  up.    RICARDO Ceja [facility]

## 2020-12-01 LAB
SARS-COV-2 RNA SPEC QL NAA+PROBE: NOT DETECTED
SPECIMEN SOURCE: NORMAL

## 2020-12-01 NOTE — PATIENT INSTRUCTIONS
Patient Education     Treating Anxiety Disorders with Therapy    If you have an anxiety disorder, you don t have to suffer anymore. Treatment is available. Therapy (also called counseling) is often a helpful treatment for anxiety disorders. With therapy, a specially trained professional (therapist) helps you face and learn to manage your anxiety. Therapy can be short-term or long-term depending on your needs. In some cases, medicine may also be prescribed with therapy. It may take time before you notice how much therapy is helping, but stick with it. With therapy, you can feel better.  Cognitive behavioral therapy (CBT)  Cognitive behavioral therapy (CBT) teaches you to manage anxiety. It does this by helping you understand how you think and act when you re anxious. Research has shown CBT to be a very effective treatment for anxiety disorders. How CBT is run is almost like a class. It involves homework and activities to build skills that teach you to cope with anxiety step by step. It can be done in a group or one-on-one, and often takes place for a set number of sessions. CBT has two main parts:    Cognitive therapy helps you identify the negative, irrational thoughts that occur with your anxiety. You ll learn to replace these with more positive, realistic thoughts.    Behavioral therapy helps you change how you react to anxiety. You ll learn coping skills and methods for relaxing to help you better deal with anxiety.  Other forms of therapy  Other therapy methods may work better for you than CBT. Or, you may move from CBT to another form of therapy as your treatment needs change. This may mean meeting with a therapist by yourself or in a group. Therapy can also help you work through problems in your life, such as drug or alcohol dependence, that may be making your anxiety worse.  Getting better takes time  Therapy will help you feel better and teach you skills to help manage anxiety long term. But change doesn t  happen right away. It takes a commitment from you. And treatment only works if you learn to face the causes of your anxiety. So, you might feel worse before you feel better. This can sometimes make it hard to stick with it. But remember: Therapy is a very effective treatment. The results will be well worth it.  Helping yourself  If anxiety is wearing you down, here are some things you can do to cope:    Check with your doctor and rule out any physical problems that may be causing the anxiety symptoms.    If an anxiety disorder is diagnosed, seek mental healthcare. This is an illness and it can respond to treatment. Most types of anxiety disorders will respond to talk therapy and medicine.    Educate yourself about anxiety disorders. Keep track of helpful online resources and books you can use during stressful periods.    Try stress management techniques such as meditation.    Consider online or in-person support groups.    Don t fight your feelings. Anxiety feeds itself. The more you worry about it, the worse it gets. Instead, try to identify what might have triggered your anxiety. Then try to put this threat in perspective.    Keep in mind that you can t control everything about a situation. Change what you can and let the rest take its course.    Exercise -- it s a great way to relieve tension and help your body feel relaxed.    Examine your life for stress, and try to find ways to reduce it.    Avoid caffeine and nicotine, which can make anxiety symptoms worse.    Fight the temptation to turn to alcohol or unprescribed drugs for relief. They only make things worse in the long run.   [employer] last reviewed this educational content on 1/1/2017 2000-2020 [employer]. [address], [place] [address]. All rights reserved. This information is not intended as a substitute for professional medical care. Always follow your healthcare professional's instructions.           Patient Education      Treating Anxiety Disorders with Therapy    If you have an anxiety disorder, you don t have to suffer anymore. Treatment is available. Therapy (also called counseling) is often a helpful treatment for anxiety disorders. With therapy, a specially trained professional (therapist) helps you face and learn to manage your anxiety. Therapy can be short-term or long-term depending on your needs. In some cases, medicine may also be prescribed with therapy. It may take time before you notice how much therapy is helping, but stick with it. With therapy, you can feel better.  Cognitive behavioral therapy (CBT)  Cognitive behavioral therapy (CBT) teaches you to manage anxiety. It does this by helping you understand how you think and act when you re anxious. Research has shown CBT to be a very effective treatment for anxiety disorders. How CBT is run is almost like a class. It involves homework and activities to build skills that teach you to cope with anxiety step by step. It can be done in a group or one-on-one, and often takes place for a set number of sessions. CBT has two main parts:    Cognitive therapy helps you identify the negative, irrational thoughts that occur with your anxiety. You ll learn to replace these with more positive, realistic thoughts.    Behavioral therapy helps you change how you react to anxiety. You ll learn coping skills and methods for relaxing to help you better deal with anxiety.  Other forms of therapy  Other therapy methods may work better for you than CBT. Or, you may move from CBT to another form of therapy as your treatment needs change. This may mean meeting with a therapist by yourself or in a group. Therapy can also help you work through problems in your life, such as drug or alcohol dependence, that may be making your anxiety worse.  Getting better takes time  Therapy will help you feel better and teach you skills to help manage anxiety long term. But change doesn t happen right away.  It takes a commitment from you. And treatment only works if you learn to face the causes of your anxiety. So, you might feel worse before you feel better. This can sometimes make it hard to stick with it. But remember: Therapy is a very effective treatment. The results will be well worth it.  Helping yourself  If anxiety is wearing you down, here are some things you can do to cope:    Check with your doctor and rule out any physical problems that may be causing the anxiety symptoms.    If an anxiety disorder is diagnosed, seek mental healthcare. This is an illness and it can respond to treatment. Most types of anxiety disorders will respond to talk therapy and medicine.    Educate yourself about anxiety disorders. Keep track of helpful online resources and books you can use during stressful periods.    Try stress management techniques such as meditation.    Consider online or in-person support groups.    Don t fight your feelings. Anxiety feeds itself. The more you worry about it, the worse it gets. Instead, try to identify what might have triggered your anxiety. Then try to put this threat in perspective.    Keep in mind that you can t control everything about a situation. Change what you can and let the rest take its course.    Exercise -- it s a great way to relieve tension and help your body feel relaxed.    Examine your life for stress, and try to find ways to reduce it.    Avoid caffeine and nicotine, which can make anxiety symptoms worse.    Fight the temptation to turn to alcohol or unprescribed drugs for relief. They only make things worse in the long run.   [employer] last reviewed this educational content on 1/1/2017 2000-2020 [employer]. [address] [place] [address], [place] [address]. All rights reserved. This information is not intended as a substitute for professional medical care. Always follow your healthcare professional's instructions.           Patient Education   After Your COVID-19  "(Coronavirus) Test  You have been tested for COVID-19 (coronavirus).   If you'll have surgery in the next few days, we'll let you know ahead of time if you have the virus. Please call your surgeon's office with any questions.  For all other patients: Results are usually available in Qom[employer] within 2 to 3 days.   If you do not have a [employer]om[employer] account, you'll get a letter in the mail in about 7 to 10 days.   Ultiushart is often the fastest way to get test results. Please sign up if you do not already have a [employer]om[employer] account. See the handout Getting COVID-19 Test Results in [employer]omuty for help.  What if my test result is positive?  If your test is positive and you have not viewed your result in Qomuty, you'll get a phone call with your result. (A positive test means that you have the virus.)     Follow the tips under \"How do I self-isolate?\" below for 10 days (20 days if you have a weak immune system).    You don't need to be retested for COVID-19 before going back to school or work. As long as you're fever-free and feeling better, you can go back to school, work and other activities after waiting the 10 or 20 days.  What if I have questions after I get my results?  If you have questions about your results, please visit our testing website at [email or web address].   After 7 to 10 days, if you have not gotten your results:     Call [phone] ([phone]) and ask to speak with our COVID-19 results team.    If you're being treated at an infusion center: Call your infusion center directly.  What are the symptoms of COVID-19?  Cough, fever and trouble breathing are the most common signs of COVID-19.  Other symptoms can include new headaches, new muscle or body aches, new and unexplained fatigue (feeling very tired), chills, sore throat, congestion (stuffy or runny nose), diarrhea (loose poop), loss of taste or smell, belly pain, and nausea or vomiting (feeling sick to your stomach or throwing " "up).  You may already have symptoms of COVID-19, or they may show up later.  What should I do if I have symptoms?  If you're having surgery: Call your surgeon's office.  For all other patients: Stay home and away from others (self-isolate) until ...    You've had no fever--and no medicine that reduces fever--for 1 full day (24 hours), AND    Other symptoms have gotten better. For example, your cough or breathing has improved, AND    At least 10 days have passed since your symptoms first started.  How do I self-isolate?    Stay in your own room, even for meals. Use your own bathroom if you can.    Stay away from others in your home. No hugging, kissing or shaking hands. No visitors.    Don't go to work, school or anywhere else.    Clean \"high touch\" surfaces often (doorknobs, counters, handles). Use household cleaning spray or wipes. You'll find a full list of  on the EPA website: www.epa.gov/pesticide-registration/list-n-disinfectants-use-against-sars-cov-2.    Cover your mouth and nose with a mask or other face covering to avoid spreading germs.    Wash your hands and face often. Use soap and water.    Caregivers in these groups are at risk for severe illness due to COVID-19:  ? People 65 years and older  ? People who live in a nursing home or long-term care facility  ? People with chronic disease (lung, heart, cancer, diabetes, kidney, liver, immunologic)  ? People who have a weakened immune system, including those who:    Are in cancer treatment    Take medicine that weakens the immune system, such as corticosteroids    Had a bone marrow or organ transplant    Have an immune deficiency    Have poorly controlled HIV or AIDS    Are obese (body mass index of 40 or higher)    Smoke regularly    Caregivers should wear gloves while washing dishes, handling laundry and cleaning bedrooms and bathrooms.    Use caution when washing and drying laundry: Don't shake dirty laundry and use the warmest water setting that " you can.    For more tips on managing your health at home, go to www.cdc.gov/coronavirus/2019-ncov/downloads/10Things.pdf.  How can I take care of myself at home?  1. Get lots of rest. Drink extra fluids (unless a doctor has told you not to).  2. Take Tylenol (acetaminophen) for fever or pain. If you have liver or kidney problems, ask your family doctor if it's OK to take Tylenol.   Adults can take either:  ? 650 mg (two 325 mg pills) every 4 to 6 hours, or   ? 1,000 mg (two 500 mg pills) every 8 hours as needed.  ? Note: Don't take more than 3,000 mg in one day. Acetaminophen is found in many medicines (both prescribed and over-the-counter medicines). Read all labels to be sure you don't take too much.   For children, check the Tylenol bottle for the right dose. The dose is based on the child's age or weight.  3. If you have other health problems (like cancer, heart failure, an organ transplant or severe kidney disease): Call your specialty clinic if you don't feel better in the next 2 days.  4. Know when to call 911. Emergency warning signs include:  ? Trouble breathing or shortness of breath  ? Chest pain or pressure that doesn't go away  ? Feeling confused like you haven't felt before, or not being able to wake up  ? Bluish-colored lips or face  5. If your doctor prescribed a blood thinner medicine: Follow their instructions.  Where can I get more information?    [facility] - About COVID-19:   [email or web address]    CDC - If You're Sick: cdc.gov/coronavirus/2019-ncov/about/steps-when-sick.html    CDC - Ending Home Isolation: www.cdc.gov/coronavirus/2019-ncov/hcp/disposition-in-home-patients.html    CDC - Caring for Someone: www.cdc.gov/coronavirus/2019-ncov/if-you-are-sick/care-for-someone.html    [facility] - Interim Guidance for Hospital Discharge to Home: www.health.[facility].[place]/diseases/coronavirus/hcp/hospdischarge.pdf    [facility] clinical trials (COVID-19 research studies):  clinicalaffairs.[facility]-clinical-trials    Below are the COVID-19 hotlines at the [place] Department of Health ([facility]). Interpreters are available.  ? For health questions: Call 866-821-9969 or 1-500.780.3111 (7 a.m. to 7 p.m.)  ? For questions about schools and childcare: Call 375-417-7885 or 1-673.710.5663 (7 a.m. to 7 p.m.)    For informational purposes only. Not to replace the advice of your health care provider. Clinically reviewed by Infection Prevention and the [facility] COVID-19 Clinical Team. Copyright   2020 [facility]. All rights reserved. [employer] 113117 - Rev 11/11/20.

## 2020-12-07 ASSESSMENT — PATIENT HEALTH QUESTIONNAIRE - PHQ9
SUM OF ALL RESPONSES TO PHQ QUESTIONS 1-9: 4
5. POOR APPETITE OR OVEREATING: SEVERAL DAYS

## 2020-12-07 ASSESSMENT — ANXIETY QUESTIONNAIRES
2. NOT BEING ABLE TO STOP OR CONTROL WORRYING: SEVERAL DAYS
3. WORRYING TOO MUCH ABOUT DIFFERENT THINGS: SEVERAL DAYS
GAD7 TOTAL SCORE: 6
7. FEELING AFRAID AS IF SOMETHING AWFUL MIGHT HAPPEN: NOT AT ALL
1. FEELING NERVOUS, ANXIOUS, OR ON EDGE: MORE THAN HALF THE DAYS
5. BEING SO RESTLESS THAT IT IS HARD TO SIT STILL: NOT AT ALL
IF YOU CHECKED OFF ANY PROBLEMS ON THIS QUESTIONNAIRE, HOW DIFFICULT HAVE THESE PROBLEMS MADE IT FOR YOU TO DO YOUR WORK, TAKE CARE OF THINGS AT HOME, OR GET ALONG WITH OTHER PEOPLE: SOMEWHAT DIFFICULT
6. BECOMING EASILY ANNOYED OR IRRITABLE: SEVERAL DAYS

## 2020-12-08 ASSESSMENT — ANXIETY QUESTIONNAIRES: GAD7 TOTAL SCORE: 6

## 2020-12-14 ENCOUNTER — HEALTH MAINTENANCE LETTER (OUTPATIENT)
Age: 40
End: 2020-12-14

## 2021-04-26 ENCOUNTER — VIRTUAL VISIT (OUTPATIENT)
Dept: FAMILY MEDICINE | Facility: CLINIC | Age: 41
End: 2021-04-26
Payer: COMMERCIAL

## 2021-04-26 DIAGNOSIS — F41.9 ANXIETY: Primary | ICD-10-CM

## 2021-04-26 PROCEDURE — 99213 OFFICE O/P EST LOW 20 MIN: CPT | Mod: TEL | Performed by: NURSE PRACTITIONER

## 2021-04-26 RX ORDER — FLUOXETINE 10 MG/1
CAPSULE ORAL
Qty: 7 CAPSULE | Refills: 0 | Status: SHIPPED | OUTPATIENT
Start: 2021-04-26 | End: 2021-06-28

## 2021-04-26 RX ORDER — VENLAFAXINE HYDROCHLORIDE 37.5 MG/1
37.5 CAPSULE, EXTENDED RELEASE ORAL DAILY
Qty: 30 CAPSULE | Refills: 0 | Status: SHIPPED | OUTPATIENT
Start: 2021-04-26 | End: 2021-05-24

## 2021-05-07 NOTE — PATIENT INSTRUCTIONS
Patient Education     Effexor Oral Tablet 37.5 mg  Uses  This medicine is used for the following purposes:    anxiety    depression    menopausal symptoms    muscle weakness    post-traumatic stress disorder  Instructions  Take the medicine with food.  It is very important that you take the medicine at about the same time every day. It will work best if you do this.  Keep the medicine at room temperature. Avoid heat and direct light.  It may take several weeks for this medicine to fully work.  It is important that you keep taking each dose of this medicine on time even if you are feeling well.  If you forget to take a dose on time, take it as soon as you remember. If it is almost time for the next dose, do not take the missed dose. Return to your normal dosing schedule. Do not take 2 doses of this medicine at one time.  Please tell your doctor and pharmacist about all the medicines you take. Include both prescription and over-the-counter medicines. Also tell them about any vitamins, herbal medicines, or anything else you take for your health.  Do not suddenly stop taking this medicine. Check with your doctor before stopping.  Cautions  Tell your doctor and pharmacist if you ever had an allergic reaction to a medicine. Symptoms of an allergic reaction can include trouble breathing, skin rash, itching, swelling, or severe dizziness.  Do not use the medication any more than instructed.  Your ability to stay alert or to react quickly may be impaired by this medicine. Do not drive or operate machinery until you know how this medicine will affect you.  Do not drink beverages with alcohol while on this medicine.  Family should check on the patient often. Call the doctor if patient becomes more depressed, has thoughts of suicide, or shows changes in behavior.  Call the doctor if there are any signs of confusion or unusual changes in behavior.  Tell the doctor or pharmacist if you are pregnant, planning to be pregnant, or  breastfeeding.  Ask your pharmacist if this medicine can interact with any of your other medicines. Be sure to tell them about all the medicines you take.  Do not start or stop any other medicines without first speaking to your doctor or pharmacist.  Call your doctor right away if you notice any unusual bleeding or bruising.  Do not share this medicine with anyone who has not been prescribed this medicine.  This medicine can cause serious side effects in some patients. Important information from the U.[place] Food and Drug Administration (FDA) is available from your pharmacist. Please review it carefully with your pharmacist to understand the risks associated with this medicine.  Side Effects  The following is a list of some common side effects from this medicine. Please speak with your doctor about what you should do if you experience these or other side effects.    agitated feeling or trouble sleeping    decreased appetite    constipation    dizziness    drowsiness or sedation    dry mouth    high blood pressure    nausea    nervousness    sweating  Call your doctor or get medical help right away if you notice any of these more serious side effects:    bleeding that is severe or takes longer to stop    unusual bruising or discoloration on skin    chest pain    cough that does not go away    pain in the eye    hallucinations (unusual thoughts, seeing or hearing things that are not real)    fast or irregular heart beats    muscle cramps    muscle weakness    dilation of the pupils    restlessness    problems with sexual functions or desire    shakiness    shortness of breath    dark, tarry stool    blurring or changes of vision    severe or persistent vomiting  A few people may have an allergic reactions to this medicine. Symptoms can include difficulty breathing, skin rash, itching, swelling, or severe dizziness. If you notice any of these symptoms, seek medical help quickly.  Extra  Please speak with your doctor,  nurse, or pharmacist if you have any questions about this medicine.  [email or web address]  IMPORTANT NOTE: This document tells you briefly how to take your medicine, but it does not tell you all there is to know about it.Your doctor or pharmacist may give you other documents about your medicine. Please talk to them if you have any questions.Always follow their advice. There is a more complete description of this medicine available in English.Scan this code on your smartphone or tablet or use the web address below. You can also ask your pharmacist for a printout. If you have any questions, please ask your pharmacist.     2021 [employer].           Patient Education     Your Body s Response to Anxiety  Normal anxiety is part of the body s natural defense system. It's an alert to a threat that is unknown, vague, or comes from your own internal fears. While you re in this state, your feelings can range from a vague sense of worry to physical sensations such as a pounding heartbeat. These feelings make you want to react to the threat. An anxiety response is normal in many situations. But when you have an anxiety disorder, the same response can occur at the wrong times.   Anxiety can be helpful  Normal anxiety is a signal from your brain. It warns you of a threat. It's a normal response to help you prevent something. Or to decrease the bad effects of something you can't control. For example, anxiety is a normal response to situations that might harm your body, separate you from a loved one, or lose your job. The symptoms of anxiety can be physical and mental.   How does it feel?  People with anxiety may have:    Dizziness    Muscle tension or pain    Restlessness    Sleeplessness    Trouble focusing    Racing heartbeat    Fast breathing    Shaking or trembling    Stomachache    Diarrhea    Loss of energy    Sweating    Cold, clammy hands    Chest pain    Dry mouth  Anxiety can also be a  problem  Anxiety can become a problem when it is hard to control, occurs for months, and interferes with important parts of your life. With an anxiety disorder, your body has the response described above, but in inappropriate ways. The response a person has depends on the anxiety disorder he or she has. With some disorders, the anxiety is way out of proportion to the threat that triggers it. With others, anxiety may occur even when there isn t a clear threat or trigger.   Who does it affect?  Some people are more likely to have lasting anxiety than others. It tends to run in families. And it affects more younger people than older people, and more women than men. But no age, race, or gender is immune to anxiety problems.   Anxiety can be treated  The good news is that the anxiety that s disrupting your life can be treated. Check with your healthcare provider and rule out any physical problems that may be causing the anxiety symptoms. If an anxiety disorder is diagnosed, seek mental healthcare. This is an illness and it can respond to treatment. Most types of anxiety disorders will respond to talk therapy (counseling) and medicines. Working with your doctor or other healthcare provider, you can develop skills to help you cope with anxiety. You can also gain the perspective you need to overcome your fears. Good sources of support or guidance can be found at your local hospital, mental health clinic, or an employee assistance program.     How to cope with anxiety  Here are some things you can do to cope:    Do what you can.  Keep in mind that you can t control everything. Change what you can. And let the rest take its course.    Exercise. This is a great way to ease tension and help your body feel relaxed.    Stay away from caffeine and nicotine.  These can make anxiety symptoms worse.    Stay sober.  Don't use alcohol or unprescribed medicines. They only make things worse in the long run.    Learn more about anxiety  disorders.  Keep track of helpful online resources and books you can use during stressful periods.    Try stress management. Try methods such as meditation.    Talk with others. Think about joining online or in-person support groups.    Get help. Find professional mental health services if your symptoms can't be managed or reduced with the above methods.  Karthik last reviewed this educational content on 4/1/2020 2000-2021 [employer]. All rights reserved. This information is not intended as a substitute for professional medical care. Always follow your healthcare professional's instructions.

## 2021-05-24 ENCOUNTER — TELEPHONE (OUTPATIENT)
Dept: FAMILY MEDICINE | Facility: CLINIC | Age: 41
End: 2021-05-24

## 2021-05-24 DIAGNOSIS — F41.9 ANXIETY: ICD-10-CM

## 2021-05-24 RX ORDER — VENLAFAXINE HYDROCHLORIDE 37.5 MG/1
37.5 CAPSULE, EXTENDED RELEASE ORAL DAILY
Qty: 30 CAPSULE | Refills: 0 | Status: SHIPPED | OUTPATIENT
Start: 2021-05-24 | End: 2021-06-28

## 2021-05-24 NOTE — LETTER
June 9, 2021          Radha Mo  [address]  [place] [address]            Dear Radha Mo,      At [facility] we care about your health and are committed to providing quality patient care. Regular appointments are a vital part of the care and management of your health and can help prevent many of the complications that can occur.      It has come to our attention that you are due for a lab appointment.  Please call [facility] at [phone] soon to schedule your follow up appointment.    If you have transferred care to another clinic please call to inform us so that we do not continue to send you reminder letters.      Sincerely,      [facility] Care Team

## 2021-05-24 NOTE — TELEPHONE ENCOUNTER
Routing refill request to provider for review/approval because:  Labs not current:  Kelby Marcus BSN, RN

## 2021-06-01 NOTE — TELEPHONE ENCOUNTER
Left voicemail message with pt to schedule a lab appointment.    Linda Baez, Patient Representative

## 2021-06-25 DIAGNOSIS — F41.9 ANXIETY: ICD-10-CM

## 2021-06-25 NOTE — TELEPHONE ENCOUNTER
Routing refill request to provider for review/approval because:  Alejandra given x1 and patient did not follow up, please advise  Ina Marcus BSN, RN

## 2021-06-25 NOTE — LETTER
August 25, 2021          Radha Mo  [address]  [place] [address]            Dear Radha Mo,      At [facility] we care about your health and are committed to providing quality patient care. Regular appointments are a vital part of the care and management of your health and can help prevent many of the complications that can occur.      It has come to our attention that you are due for a follow up appointment.  Please call [facility] at [phone] soon to schedule your follow up appointment.    If you have transferred care to another clinic please call to inform us so that we do not continue to send you reminder letters.      Sincerely,      [facility] Care Team

## 2021-06-28 ENCOUNTER — E-VISIT (OUTPATIENT)
Dept: URGENT CARE | Facility: CLINIC | Age: 41
End: 2021-06-28
Payer: COMMERCIAL

## 2021-06-28 DIAGNOSIS — F32.1 MODERATE MAJOR DEPRESSION (H): Primary | ICD-10-CM

## 2021-06-28 PROCEDURE — 99421 OL DIG E/M SVC 5-10 MIN: CPT | Performed by: NURSE PRACTITIONER

## 2021-06-28 RX ORDER — VENLAFAXINE 75 MG/1
75 TABLET ORAL 3 TIMES DAILY
Qty: 90 TABLET | Refills: 1 | Status: SHIPPED | OUTPATIENT
Start: 2021-06-28

## 2021-06-28 RX ORDER — VENLAFAXINE HYDROCHLORIDE 37.5 MG/1
37.5 CAPSULE, EXTENDED RELEASE ORAL DAILY
Qty: 30 CAPSULE | Refills: 0 | Status: SHIPPED | OUTPATIENT
Start: 2021-06-28

## 2021-06-28 ASSESSMENT — ANXIETY QUESTIONNAIRES
GAD7 TOTAL SCORE: 5
2. NOT BEING ABLE TO STOP OR CONTROL WORRYING: SEVERAL DAYS
3. WORRYING TOO MUCH ABOUT DIFFERENT THINGS: SEVERAL DAYS
6. BECOMING EASILY ANNOYED OR IRRITABLE: SEVERAL DAYS
4. TROUBLE RELAXING: SEVERAL DAYS
GAD7 TOTAL SCORE: 5
7. FEELING AFRAID AS IF SOMETHING AWFUL MIGHT HAPPEN: NOT AT ALL
7. FEELING AFRAID AS IF SOMETHING AWFUL MIGHT HAPPEN: NOT AT ALL
1. FEELING NERVOUS, ANXIOUS, OR ON EDGE: SEVERAL DAYS
5. BEING SO RESTLESS THAT IT IS HARD TO SIT STILL: NOT AT ALL
GAD7 TOTAL SCORE: 5

## 2021-06-28 ASSESSMENT — PATIENT HEALTH QUESTIONNAIRE - PHQ9
SUM OF ALL RESPONSES TO PHQ QUESTIONS 1-9: 4
SUM OF ALL RESPONSES TO PHQ QUESTIONS 1-9: 4
10. IF YOU CHECKED OFF ANY PROBLEMS, HOW DIFFICULT HAVE THESE PROBLEMS MADE IT FOR YOU TO DO YOUR WORK, TAKE CARE OF THINGS AT HOME, OR GET ALONG WITH OTHER PEOPLE: SOMEWHAT DIFFICULT

## 2021-06-28 NOTE — PATIENT INSTRUCTIONS
Using Antidepressants  Depression is a mood disorder that affects the way you think and feel. The most common symptom is a feeling of deep sadness. This feeling doesn't go away or get better on its own. But most types of depression can be helped with therapy and antidepressant medicines. (Note: This covers antidepressant use in adults only.)     What do antidepressants do?  Antidepressants restore the balance of certain chemicals in your brain to help ease your depression. You will likely feel better in 4 to 6 weeks. But you may continue taking antidepressants for a year or more to keep your symptoms from coming back. Some people with depression need to take antidepressants for life. There are several types of antidepressants. The main types are described below.   Selective serotonin reuptake inhibitors (SSRIs)  SSRIs are effective medicines for the treatment of depression. They tend to have fewer side effects than other antidepressants. Possible side effects include anxiety, trouble sleeping, nausea, diarrhea, sexual dysfunction, and headaches. In rare cases, they may make you more depressed. SSRIs shouldn t be mixed with certain other medicines. Talk with your healthcare provider about all the medicines, herbs, and supplements you are taking.   Tricyclic antidepressants  Tricyclics help severe or long-term depression. They have been used for many years with good results. Possible side effects include blurred vision, dry mouth, and constipation.   Monoamine oxidase inhibitors (MAOIs)  If you don t respond to tricyclics or SSRIs, your healthcare provider may prescribe MAOIs. These medicines can be very effective. But people taking MAOIs must stay away from certain foods and medicines. Your healthcare provider can tell you more.   Lithium  If you have bipolar disorder, you may take a medicine called lithium. This medicine helps even out your mood. Possible side effects are weight gain, trembling, loose stool, and  nausea. Lithium is also used:     For people who have unipolar depression and have not responded to other antidepressants    For people who have a sudden (acute) episode of unipolar depression    As a maintenance medicine to prevent unipolar depression from happening again  Things to stay away from if you are taking MAOIs   If you are taking MAOIs, don t have any of the following:     Beans    Aged cheese    Chocolate    Red wine    Most cold medicines    Certain medicines (ask your healthcare provider)  To reduce the risk of lithium poisoning  You can reduce the risk of lithium poisoning by following this advice:    Take only the prescribed amount of lithium. If your depressive symptoms get worse, contact your healthcare provider. Never increase or decrease your medicine on your own.    Drink plenty of fluids other than coffee, tea, and soda.    Limit salt in your diet.    Before using other prescribed medicines or over-the-counter medicines, check with your pharmacist. This is to be sure the medicines won t interact with the lithium.    Never share your medicines or use another person's medicines, even if it is the same medicine and dose.    Keep follow-up appointments.    Have your lithium blood level checked as advised. You will need blood work more often when symptoms are not under control.  If you have side effects  The side effects of antidepressants are usually mild. But if you have troubling side effects, call your healthcare provider. Changing the dosage or type of medicine may help. Never stop taking medicines on your own.   Karthik last reviewed this educational content on 9/1/2019 2000-2021 [employer]. All rights reserved. This information is not intended as a substitute for professional medical care. Always follow your healthcare professional's instructions.          Depression: Tips to Help Yourself    As your healthcare providers help treat your depression, you can also help  yourself. Keep in mind that your illness affects you emotionally, physically, mentally, and socially. So full recovery will take time. Take care of your body and your soul, and be patient with yourself as you get better.  Self-care    Educate yourself. Read about treatment and medicine options. If you have the energy, attend local conferences or support groups. Keep a list of useful websites and helpful books and use them as needed. This illness is not your fault. Don t blame yourself for your depression.    Manage early symptoms. If you notice symptoms returning, experience triggers, or identify other factors that may lead to a depressive episode, get help as soon as possible. Ask trusted friends and family to monitor your behavior and let you know if they see anything of concern.    Work with your provider. Find a provider you can trust. Communicate honestly with that person and share information on your treatment for depression and your reaction to medicines.    Be prepared for a crisis. Know what to do if you experience a crisis. Keep the phone number of a crisis hotline and know the location of your community's urgent care centers and the closest emergency department.    Hold off on big decisions. Depression can cloud your judgment. So wait until you feel better before making major life decisions, such as changing jobs, moving, or getting  or .    Be patient. Recovering from depression is a process. Don t be discouraged if it takes some time to feel better.    Keep it simple. Depression saps your energy and concentration. So you won t be able to do all the things you used to do. Set small goals and do what you can.    Be with others. Don t isolate yourself--you ll only feel worse. Try to be with other people. And take part in fun activities when you can. Go to a movie, ballgame, [employer] service, or social event. Talk openly with people you can trust. And accept help when it s offered.    Take  care of your body  People with depression often lose the desire to take care of themselves. That only makes their problems worse. During treatment and afterward, make a point to:    Exercise. It s a great way to take care of your body. And studies have shown that exercise helps fight depression. Aim for 30 minutes of moderate activity a day. Walking in small blocks of time (5-10 minutes) is a good way to start, but anything that gets you moving (gardening, house cleaning) counts.    Don't use drugs and alcohol. These may ease the pain in the short term. But they ll only make your problems worse in the long run.    Get relief from stress. Ask your healthcare provider for relaxation exercises and techniques to help relieve stress. Consider activities like meditation, yoga, or Moshe Chi.    Eat right. A balanced and healthy diet helps keep your body healthy.    Get adequate sleep. Aim for 8 hours per night. Too much or too little sleep can cause other physical and emotional problems.  [employer] last reviewed this educational content on 12/1/2019 2000-2021 [employer]. All rights reserved. This information is not intended as a substitute for professional medical care. Always follow your healthcare professional's instructions.

## 2021-06-28 NOTE — TELEPHONE ENCOUNTER
Due for BMP, follow up.  Pls schedule lab only appt and virtual visit. No further refills until she has been seen.   Carlie SILVA, CNP

## 2021-06-29 ASSESSMENT — ANXIETY QUESTIONNAIRES: GAD7 TOTAL SCORE: 5

## 2021-06-29 ASSESSMENT — PATIENT HEALTH QUESTIONNAIRE - PHQ9: SUM OF ALL RESPONSES TO PHQ QUESTIONS 1-9: 4

## 2021-07-22 NOTE — TELEPHONE ENCOUNTER
Left voicemail message with pt to call and schedule an appointment.    Linda Baez, Patient Representative

## 2021-08-05 ENCOUNTER — MYC MEDICAL ADVICE (OUTPATIENT)
Dept: FAMILY MEDICINE | Facility: CLINIC | Age: 41
End: 2021-08-05

## 2021-10-02 ENCOUNTER — HEALTH MAINTENANCE LETTER (OUTPATIENT)
Age: 41
End: 2021-10-02

## 2021-10-19 PROBLEM — F32.9 MAJOR DEPRESSION: Status: ACTIVE | Noted: 2019-10-01

## 2022-01-22 ENCOUNTER — HEALTH MAINTENANCE LETTER (OUTPATIENT)
Age: 42
End: 2022-01-22

## 2022-09-03 ENCOUNTER — HEALTH MAINTENANCE LETTER (OUTPATIENT)
Age: 42
End: 2022-09-03

## 2023-04-29 ENCOUNTER — HEALTH MAINTENANCE LETTER (OUTPATIENT)
Age: 43
End: 2023-04-29

## 2024-02-17 ENCOUNTER — HEALTH MAINTENANCE LETTER (OUTPATIENT)
Age: 44
End: 2024-02-17

## 2024-07-06 ENCOUNTER — HEALTH MAINTENANCE LETTER (OUTPATIENT)
Age: 44
End: 2024-07-06